# Patient Record
Sex: FEMALE | Race: WHITE | URBAN - METROPOLITAN AREA
[De-identification: names, ages, dates, MRNs, and addresses within clinical notes are randomized per-mention and may not be internally consistent; named-entity substitution may affect disease eponyms.]

---

## 2017-08-28 ENCOUNTER — APPOINTMENT (OUTPATIENT)
Dept: CT IMAGING | Facility: CLINIC | Age: 37
End: 2017-08-28
Attending: EMERGENCY MEDICINE
Payer: COMMERCIAL

## 2017-08-28 ENCOUNTER — HOSPITAL ENCOUNTER (EMERGENCY)
Facility: CLINIC | Age: 37
Discharge: HOME | End: 2017-08-28
Attending: EMERGENCY MEDICINE | Admitting: EMERGENCY MEDICINE
Payer: COMMERCIAL

## 2017-08-28 VITALS
WEIGHT: 230 LBS | HEART RATE: 65 BPM | OXYGEN SATURATION: 98 % | DIASTOLIC BLOOD PRESSURE: 69 MMHG | RESPIRATION RATE: 16 BRPM | TEMPERATURE: 98.3 F | SYSTOLIC BLOOD PRESSURE: 122 MMHG

## 2017-08-28 DIAGNOSIS — D64.9 ANEMIA, UNSPECIFIED TYPE: ICD-10-CM

## 2017-08-28 DIAGNOSIS — R42 DIZZINESS: ICD-10-CM

## 2017-08-28 LAB
ALBUMIN UR-MCNC: NEGATIVE MG/DL
ANION GAP SERPL CALCULATED.3IONS-SCNC: 7 MMOL/L (ref 3–14)
ANION GAP SERPL CALCULATED.3IONS-SCNC: NORMAL MMOL/L (ref 6–17)
APPEARANCE UR: CLEAR
BILIRUB UR QL STRIP: NEGATIVE
BUN SERPL-MCNC: 11 MG/DL (ref 7–30)
BUN SERPL-MCNC: NORMAL MG/DL (ref 7–30)
CALCIUM SERPL-MCNC: 8 MG/DL (ref 8.5–10.1)
CALCIUM SERPL-MCNC: NORMAL MG/DL (ref 8.5–10.1)
CHLORIDE SERPL-SCNC: 109 MMOL/L (ref 94–109)
CHLORIDE SERPL-SCNC: NORMAL MMOL/L (ref 94–109)
CO2 SERPL-SCNC: 23 MMOL/L (ref 20–32)
CO2 SERPL-SCNC: NORMAL MMOL/L (ref 20–32)
COLOR UR AUTO: ABNORMAL
CREAT SERPL-MCNC: 0.69 MG/DL (ref 0.52–1.04)
CREAT SERPL-MCNC: NORMAL MG/DL (ref 0.52–1.04)
ERYTHROCYTE [DISTWIDTH] IN BLOOD BY AUTOMATED COUNT: 13.8 % (ref 10–15)
ERYTHROCYTE [DISTWIDTH] IN BLOOD BY AUTOMATED COUNT: NORMAL % (ref 10–15)
GFR SERPL CREATININE-BSD FRML MDRD: >90 ML/MIN/1.7M2
GFR SERPL CREATININE-BSD FRML MDRD: NORMAL ML/MIN/1.7M2
GLUCOSE SERPL-MCNC: 94 MG/DL (ref 70–99)
GLUCOSE SERPL-MCNC: NORMAL MG/DL (ref 70–99)
GLUCOSE UR STRIP-MCNC: NEGATIVE MG/DL
HCG UR QL: NEGATIVE
HCT VFR BLD AUTO: 31.7 % (ref 35–47)
HCT VFR BLD AUTO: NORMAL % (ref 35–47)
HGB BLD-MCNC: 10.7 G/DL (ref 11.7–15.7)
HGB BLD-MCNC: NORMAL G/DL (ref 11.7–15.7)
HGB UR QL STRIP: ABNORMAL
KETONES UR STRIP-MCNC: NEGATIVE MG/DL
LEUKOCYTE ESTERASE UR QL STRIP: NEGATIVE
MCH RBC QN AUTO: 28.9 PG (ref 26.5–33)
MCH RBC QN AUTO: NORMAL PG (ref 26.5–33)
MCHC RBC AUTO-ENTMCNC: 33.8 G/DL (ref 31.5–36.5)
MCHC RBC AUTO-ENTMCNC: NORMAL G/DL (ref 31.5–36.5)
MCV RBC AUTO: 86 FL (ref 78–100)
MCV RBC AUTO: NORMAL FL (ref 78–100)
NITRATE UR QL: NEGATIVE
PH UR STRIP: 6.5 PH (ref 5–7)
PLATELET # BLD AUTO: 332 10E9/L (ref 150–450)
PLATELET # BLD AUTO: NORMAL 10E9/L (ref 150–450)
POTASSIUM SERPL-SCNC: 3.7 MMOL/L (ref 3.4–5.3)
POTASSIUM SERPL-SCNC: NORMAL MMOL/L (ref 3.4–5.3)
RBC # BLD AUTO: 3.7 10E12/L (ref 3.8–5.2)
RBC # BLD AUTO: NORMAL 10E12/L (ref 3.8–5.2)
RBC #/AREA URNS AUTO: <1 /HPF (ref 0–2)
SODIUM SERPL-SCNC: 139 MMOL/L (ref 133–144)
SODIUM SERPL-SCNC: NORMAL MMOL/L (ref 133–144)
SOURCE: ABNORMAL
SP GR UR STRIP: 1 (ref 1–1.03)
UROBILINOGEN UR STRIP-MCNC: NORMAL MG/DL (ref 0–2)
WBC # BLD AUTO: 9.3 10E9/L (ref 4–11)
WBC # BLD AUTO: NORMAL 10E9/L (ref 4–11)
WBC #/AREA URNS AUTO: <1 /HPF (ref 0–2)

## 2017-08-28 PROCEDURE — 96361 HYDRATE IV INFUSION ADD-ON: CPT

## 2017-08-28 PROCEDURE — 80048 BASIC METABOLIC PNL TOTAL CA: CPT | Performed by: EMERGENCY MEDICINE

## 2017-08-28 PROCEDURE — 85027 COMPLETE CBC AUTOMATED: CPT | Performed by: EMERGENCY MEDICINE

## 2017-08-28 PROCEDURE — 81025 URINE PREGNANCY TEST: CPT | Performed by: EMERGENCY MEDICINE

## 2017-08-28 PROCEDURE — 96360 HYDRATION IV INFUSION INIT: CPT

## 2017-08-28 PROCEDURE — 25000131 ZZH RX MED GY IP 250 OP 636 PS 637: Performed by: EMERGENCY MEDICINE

## 2017-08-28 PROCEDURE — 25000128 H RX IP 250 OP 636: Performed by: EMERGENCY MEDICINE

## 2017-08-28 PROCEDURE — 99285 EMERGENCY DEPT VISIT HI MDM: CPT | Mod: 25

## 2017-08-28 PROCEDURE — 81001 URINALYSIS AUTO W/SCOPE: CPT | Performed by: EMERGENCY MEDICINE

## 2017-08-28 PROCEDURE — 70450 CT HEAD/BRAIN W/O DYE: CPT

## 2017-08-28 PROCEDURE — 25000125 ZZHC RX 250: Performed by: EMERGENCY MEDICINE

## 2017-08-28 PROCEDURE — 70498 CT ANGIOGRAPHY NECK: CPT

## 2017-08-28 RX ORDER — IOPAMIDOL 755 MG/ML
70 INJECTION, SOLUTION INTRAVASCULAR ONCE
Status: COMPLETED | OUTPATIENT
Start: 2017-08-28 | End: 2017-08-28

## 2017-08-28 RX ORDER — MECLIZINE HCL 12.5 MG 12.5 MG/1
12.5 TABLET ORAL 4 TIMES DAILY PRN
Qty: 20 TABLET | Refills: 0 | Status: SHIPPED | OUTPATIENT
Start: 2017-08-28 | End: 2017-10-18

## 2017-08-28 RX ORDER — MECLIZINE HYDROCHLORIDE 25 MG/1
25 TABLET ORAL ONCE
Status: COMPLETED | OUTPATIENT
Start: 2017-08-28 | End: 2017-08-28

## 2017-08-28 RX ADMIN — MECLIZINE 25 MG: 25 TABLET ORAL at 00:49

## 2017-08-28 RX ADMIN — IOPAMIDOL 70 ML: 755 INJECTION, SOLUTION INTRAVENOUS at 01:23

## 2017-08-28 RX ADMIN — SODIUM CHLORIDE 1000 ML: 9 INJECTION, SOLUTION INTRAVENOUS at 00:48

## 2017-08-28 RX ADMIN — SODIUM CHLORIDE 80 ML: 9 INJECTION, SOLUTION INTRAVENOUS at 01:22

## 2017-08-28 ASSESSMENT — ENCOUNTER SYMPTOMS
DIZZINESS: 1
NUMBNESS: 0
WEAKNESS: 0
HEADACHES: 0
FREQUENCY: 1

## 2017-08-28 NOTE — ED AVS SNAPSHOT
Emergency Department    [address] [facility] [address]    Phone:  [phone]    Fax:  [phone]                                       Chance Mazariegos   MRN: [id number]    Department:   Emergency Department   Date of Visit:  8/28/2017           After Visit Summary Signature Page     I have received my discharge instructions, and my questions have been answered. I have discussed any challenges I see with this plan with the nurse or doctor.    ..........................................................................................................................................  Patient/Patient Representative Signature      ..........................................................................................................................................  Patient Representative Print Name and Relationship to Patient    ..................................................               ................................................  Date                                            Time    ..........................................................................................................................................  Reviewed by Signature/Title    ...................................................              ..............................................  Date                                                            Time

## 2017-08-28 NOTE — ED AVS SNAPSHOT
Emergency Department    [address] [facility] [address]    Phone:  [phone]    Fax:  [phone]                                       Chance Mazariegos   MRN: [id number]    Department:   Emergency Department   Date of Visit:  8/28/2017           Patient Information     Date Of Birth          1980        Your diagnoses for this visit were:     Dizziness     Anemia, unspecified type        You were seen by Arik Collado MD.      Follow-up Information     Follow up with [facility]. Schedule an appointment as soon as possible for a visit in 2 days.    Specialty:  Family Practice    Contact information:    [address] [facility] [address]  [facility] [id number] [phone]          Follow up with  Emergency Department.    Specialty:  EMERGENCY MEDICINE    Why:  If symptoms worsen    Contact information:    [address] [facility] [id number] [phone]      24 Hour Appointment Hotline       To make an appointment at any [facility], call [phone] ([phone]). If you don't have a family doctor or clinic, we will help you find one. [facility] clinics are conveniently located to serve the needs of you and your family.          ED Discharge Orders     PHYSICAL THERAPY REFERRAL       *This therapy referral will be filtered to a centralized scheduling office at [facility] and the patient will receive a call to schedule an appointment at a [facility] location most convenient for them. *     [facility] provides Physical Therapy evaluation and treatment and many specialty services across the [place] system.  If requesting a specialty program, please choose from the list below.    If you have not heard from the scheduling office within 2 business days, please call [phone] for all locations, with the exception of [place], please call [phone].  Treatment: Evaluation & Treatment  Special  "Instructions/Modalities:   Special Programs: Balance/Vestibular    Please be aware that coverage of these services is subject to the terms and limitations of your health insurance plan.  Call member services at your health plan with any benefit or coverage questions.      **Note to Provider:  If you are referring outside of [place] for the therapy appointment, please list the name of the location in the \"special instructions\" above, print the referral and give to the patient to schedule the appointment.                     Review of your medicines      START taking        Dose / Directions Last dose taken    meclizine 12.5 MG tablet   Commonly known as:  ANTIVERT   Dose:  12.5 mg   Quantity:  20 tablet        Take 1 tablet (12.5 mg) by mouth 4 times daily as needed for dizziness   Refills:  0          Our records show that you are taking the medicines listed below. If these are incorrect, please call your family doctor or clinic.        Dose / Directions Last dose taken    VITAMIN D (CHOLECALCIFEROL) PO        Take by mouth daily   Refills:  0                Prescriptions were sent or printed at these locations (1 Prescription)                   Other Prescriptions                Printed at Department/Unit printer (1 of 1)         meclizine (ANTIVERT) 12.5 MG tablet                Procedures and tests performed during your visit     Procedure/Test Number of Times Performed    Basic metabolic panel 2    CBC (+ platelets, no diff) 1    CBC with platelets 1    HCG qualitative urine 1    Head CT w/o contrast 1    Head/neck angiogram CT  w & w/o contrast 1    UA with Microscopic 1      Orders Needing Specimen Collection     None      Pending Results     Date and Time Order Name Status Description    8/28/2017 0029 Head/neck angiogram CT  w & w/o contrast In process     8/28/2017 0029 Head CT w/o contrast In process             Pending Culture Results     No orders found from 8/26/2017 to 8/29/2017.            Pending " Results Instructions     If you had any lab results that were not finalized at the time of your Discharge, you can call the ED Lab Result RN at [phone]. You will be contacted by this team for any positive Lab results or changes in treatment. The nurses are available 7 days a week from 10A to 6:30P.  You can leave a message 24 hours per day and they will return your call.        Test Results From Your Hospital Stay        8/28/2017 12:36 AM      Component Results     Component Value Ref [place] & Units Status    Color Urine Straw  Final    Appearance Urine Clear  Final    Glucose Urine Negative NEG^Negative mg/dL Final    Bilirubin Urine Negative NEG^Negative Final    Ketones Urine Negative NEG^Negative mg/dL Final    Specific Gravity Urine 1.000 (L) 1.003 - 1.035 Final    Blood Urine Trace (A) NEG^Negative Final    pH Urine 6.5 5.0 - 7.0 pH Final    Protein Albumin Urine Negative NEG^Negative mg/dL Final    Urobilinogen mg/dL Normal 0.0 - 2.0 mg/dL Final    Nitrite Urine Negative NEG^Negative Final    Leukocyte Esterase Urine Negative NEG^Negative Final    Source Midstream Urine  Final    WBC Urine <1 0 - 2 /HPF Final    RBC Urine <1 0 - 2 /HPF Final         8/28/2017 12:36 AM      Component Results     Component Value Ref [place] & Units Status    HCG Qual Urine Negative NEG^Negative Final    This test is for screening purposes.  Results should be interpreted along with   the clinical picture.  Confirmation testing is available if warranted by   ordering [id number], HCG Quantitative Pregnancy.           8/28/2017  1:35 AM      Component Results     Component Value Ref Range & Units Status    WBC Canceled, Test credited 4.0 - 11.0 10e9/L Final    Unsatisfactory specimen - clotted  NOTIFIED ER VIA TRACKBOARD AT 0134      RBC Count Canceled, Test credited 3.8 - 5.2 10e12/L Final    Unsatisfactory specimen - clotted  NOTIFIED ER VIA TRACKBOARD AT 0134      Hemoglobin Canceled, Test credited 11.7 - 15.7 g/dL Final     Unsatisfactory specimen - clotted  NOTIFIED ER VIA TRACKBOARD AT 0134      Hematocrit Canceled, Test credited 35.0 - 47.0 % Final    Unsatisfactory specimen - clotted  NOTIFIED ER VIA TRACKBOARD AT 0134      MCV Canceled, Test credited 78 - 100 fl Final    Unsatisfactory specimen - clotted  NOTIFIED ER VIA TRACKBOARD AT 0134      MCH Canceled, Test credited 26.5 - 33.0 pg Final    Unsatisfactory specimen - clotted  NOTIFIED ER VIA TRACKBOARD AT 0134      MCHC Canceled, Test credited 31.5 - 36.5 g/dL Final    Unsatisfactory specimen - clotted  NOTIFIED ER VIA TRACKBOARD AT 0134      RDW Canceled, Test credited 10.0 - 15.0 % Final    Unsatisfactory specimen - clotted  NOTIFIED ER VIA TRACKBOARD AT 0134      Platelet Count Canceled, Test credited 150 - 450 10e9/L Final    Unsatisfactory specimen - clotted  NOTIFIED ER VIA TRACKBOARD AT 0134           8/28/2017  1:20 AM      Component Results     Component Value Ref [place] & Units Status    Sodium Canceled, Test credited 133 - 144 mmol/L Final    Specimen hemolyzed  NOTIFIED ER VIA TRACKBOARD AT 0119      Potassium Canceled, Test credited 3.4 - 5.3 mmol/L Final    Specimen hemolyzed  NOTIFIED ER VIA TRACKBOARD AT 0119      Chloride Canceled, Test credited 94 - 109 mmol/L Final    Specimen hemolyzed  NOTIFIED ER VIA TRACKBOARD AT 0119      Carbon Dioxide Canceled, Test credited 20 - 32 mmol/L Final    Specimen hemolyzed  NOTIFIED ER VIA TRACKBOARD AT 0119      Anion Gap Canceled, Test credited 6 - 17 mmol/L Final    Specimen hemolyzed  NOTIFIED ER VIA TRACKBOARD AT 0119      Glucose Canceled, Test credited 70 - 99 mg/dL Final    Specimen hemolyzed  NOTIFIED ER VIA TRACKBOARD AT 0119      Urea Nitrogen Canceled, Test credited 7 - 30 mg/dL Final    Specimen hemolyzed  NOTIFIED ER VIA TRACKBOARD AT 0119      Creatinine Canceled, Test credited 0.52 - 1.04 mg/dL Final    Specimen hemolyzed  NOTIFIED ER VIA TRACKBOARD AT 0119      GFR Estimate Canceled, Test credited >60  mL/min/1.7m2 Final    Specimen hemolyzed  NOTIFIED ER VIA TRACKBOARD AT 0119      GFR Estimate If Black Canceled, Test credited >60 mL/min/1.7m2 Final    Specimen hemolyzed  NOTIFIED ER VIA TRACKBOARD AT 0119      Calcium Canceled, Test credited 8.5 - 10.1 mg/dL Final    Specimen hemolyzed  NOTIFIED ER VIA TRACKBOARD AT 0119           8/28/2017  1:21 AM      Result not yet available     Exam Ended         8/28/2017  1:18 AM      Result not yet available     Exam Ended         8/28/2017  1:58 AM      Component Results     Component Value Ref Range & Units Status    Sodium 139 133 - 144 mmol/L Final    Potassium 3.7 3.4 - 5.3 mmol/L Final    Chloride 109 94 - 109 mmol/L Final    Carbon Dioxide 23 20 - 32 mmol/L Final    Anion Gap 7 3 - 14 mmol/L Final    Glucose 94 70 - 99 mg/dL Final    Urea Nitrogen 11 7 - 30 mg/dL Final    Creatinine 0.69 0.52 - 1.04 mg/dL Final    GFR Estimate >90 >60 mL/min/1.7m2 Final    Non  GFR Calc    GFR Estimate If Black >90 >60 mL/min/1.7m2 Final    African American GFR Calc    Calcium 8.0 (L) 8.5 - 10.1 mg/dL Final         8/28/2017  1:44 AM      Component Results     Component Value Ref Range & Units Status    WBC 9.3 4.0 - 11.0 10e9/L Final    RBC Count 3.70 (L) 3.8 - 5.2 10e12/L Final    Hemoglobin 10.7 (L) 11.7 - 15.7 g/dL Final    Hematocrit 31.7 (L) 35.0 - 47.0 % Final    MCV 86 78 - 100 fl Final    MCH 28.9 26.5 - 33.0 pg Final    MCHC 33.8 31.5 - 36.5 g/dL Final    RDW 13.8 10.0 - 15.0 % Final    Platelet Count 332 150 - 450 10e9/L Final                Clinical Quality Measure: Blood Pressure Screening     Your blood pressure was checked while you were in the emergency department today. The last reading we obtained was  BP: 122/69 . Please read the guidelines below about what these numbers mean and what you should do about them.  If your systolic blood pressure (the top number) is less than 120 and your diastolic blood pressure (the bottom number) is less than 80,  "then your blood pressure is normal. There is nothing more that you need to do about it.  If your systolic blood pressure (the top number) is 120-139 or your diastolic blood pressure (the bottom number) is 80-89, your blood pressure may be higher than it should be. You should have your blood pressure rechecked within a year by a primary care provider.  If your systolic blood pressure (the top number) is 140 or greater or your diastolic blood pressure (the bottom number) is 90 or greater, you may have high blood pressure. High blood pressure is treatable, but if left untreated over time it can put you at risk for heart attack, stroke, or kidney failure. You should have your blood pressure rechecked by a primary care provider within the next 4 weeks.  If your provider in the emergency department today gave you specific instructions to follow-up with your doctor or provider even sooner than that, you should follow that instruction and not wait for up to 4 weeks for your follow-up visit.        Thank you for choosing [facility]       Thank you for choosing [facility] for your care. Our goal is always to provide you with excellent care. Hearing back from our patients is one way we can continue to improve our services. Please take a few minutes to complete the written survey that you may receive in the mail after you visit with us. Thank you!        [employer] Information     [employer] lets you send messages to your doctor, view your test results, renew your prescriptions, schedule appointments and more. To sign up, go to [email or web address] . Click on \"Log in\" on the left side of the screen, which will take you to the Welcome page. Then click on \"Sign up Now\" on the right side of the page.     You will be asked to enter the access code listed below, as well as some personal information. Please follow the directions to create your username and password.     Your access code is: [email or web address]  Expires: 11/26/2017  2:26 AM   "   Your access code will  in 90 days. If you need help or a new code, please call your [facility] clinic or [phone].        Care EveryWhere ID     This is your Care EveryWhere ID. This could be used by other organizations to access your [facility] medical records  [id number]        Equal Access to Services     LUCY GLASER : Josias toneyo Soelizabeth, waaxda luqadaha, qaybta kaalmaraysa nguyen, hortencia sepulveda. So [facility] [phone].    ATENCIÓN: Si habla español, tiene a dickerson disposición servicios gratuitos de asistencia lingüística. Llame al [phone].    We comply with applicable federal civil rights laws and [place] laws. We do not discriminate on the basis of race, color, national origin, age, disability sex, sexual orientation or gender identity.            After Visit Summary       This is your record. Keep this with you and show to your community pharmacist(s) and doctor(s) at your next visit.

## 2017-08-28 NOTE — ED PROVIDER NOTES
History     Chief Complaint:  Dizziness      HPI   Chance Mazariegos is a 36 year old female who presents to the emergency department for evaluation of dizziness. The patient states that she had driven home from [place] earlier today and arrived home at approximately 2200. She notes that she went to lay down on the couch as she was feeling generally fatigued and was having some slight left ear pain. When she sat up suddenly to take a dose of ibuprofen for her ear discomfort, she suddenly felt generally quite dizzy and unsteady. This sensation of unsteadiness is exacerbated by positional changes and resolve when she is sitting still after approximately 20-30 seconds. She denies any recent headache, visual changes, tinnitus, or new numbness or weakness. She does, however, note that she has been urinating frequently this evening. Of note, the patient notes that she had recent chiropractor adjustment on her neck approximately two weeks ago.      Allergies:  Penicillins    Medications:    The patient is currently on no regular medications.      Past Medical History:    Hashimoto's thyroiditis    Past Surgical History:    GI surgery due to intestinal malrotation  Family / Social History:    No past pertinent family history.     Social History:  Presents with her significant other.   Negative for tobacco use.  Marital Status:   [2]    Review of Systems   HENT: Positive for ear pain. Negative for tinnitus.    Eyes: Negative for visual disturbance.   Genitourinary: Positive for frequency.   Neurological: Positive for dizziness. Negative for weakness, numbness and headaches.   All other systems reviewed and are negative.    Physical Exam   First Vitals:  BP: 143/74  Pulse: 82  Temp: 98.3  F (36.8  C)  Resp: 18  Weight: 104.3 kg (230 lb)  SpO2: 100 %      Physical Exam  General:                        Well-nourished                        Speaking in full sentences  Eyes:                        Conjunctiva without  injection or scleral icterus                        PERRL                        No nystagmus noted  ENT:                        Moist mucous membranes                        Posterior oropharynx clear without erythema or exudate                        Nares patent                        Pinnae normal  Neck:                        Full ROM                        No stiffness appreciated  Resp:                        Lungs CTAB                        No crackles, wheezing or audible rubs                        Good air movement  CV:                                        Normal rate, regular rhythm                        S1 and S2 present                        No murmur, gallop or rub  GI:                        BS present                        Abdomen soft without distention                        Non-tender to light and deep palpation                        No guarding or rebound tenderness  Skin:                        Warm, dry, well perfused                        No rashes or open wounds on exposed skin  MSK:                        Moves all extremities                        No focal deformities or swelling  Neuro:                        Alert                        CN III-XII intact                        5/5  strength                        5/5 ankle dorsi/plantarflexion                        SILT in BUE and BLE                        Normal finger-to-nose                        Answers questions appropriately                        Moves all extremities equally                        Gait stable  Psych:                        Anxious    Emergency Department Course   Imaging:  Radiographic findings were communicated with the patient who voiced understanding of the findings.    CT Head without contrast:   1. Normal head CT.   2. No CT findings of mass, infarct, or hemorrhage As per radiology.    CT Head Neck Angio with and without contrast:   Normal intracranial circulation. No hemodynamically  significant narrowing throughout major neck vessels.  As per radiology.    Laboratory:  CBC: WBC: 9.3, HGB: 10.7 (L), PLT: 332  BMP: Calcium 8.0 (L), o/w WNL (Creatinine: 0.69)    UA with micro: specific gravity 1.000 (L), trace blood o/w negative  HCG quantitative urine: Negative    Interventions:  0048 NS 1L IV  0049 Meclizine 25 mg PO    Emergency Department Course:  Nursing notes and vitals reviewed. 0013 I performed an exam of the patient as documented above.     IV inserted. Medicine administered as documented above. Blood drawn. This was sent to the lab for further testing, results above.    The patient was sent for a Head CT and CTA Head and Neck while in the emergency department, findings above.     The patient provided a urine sample here in the emergency department. This was sent for laboratory testing, findings above.     0154 I rechecked the patient and discussed the results of her workup thus far.     The patient was ambulated here in the emergency department without difficulty.     0219 I reevaluated the patient and provided an update in regards to her ED course.  She notes that she is feeling improved at this time.     Findings and plan explained to the Patient. Patient discharged home with instructions regarding supportive care, medications, and reasons to return. The importance of close follow-up was reviewed. The patient was prescribed meclizine.     I personally reviewed the laboratory results with the Patient and answered all related questions prior to discharge.     HiNTs Exam for Vertigo of Central Cause (reference)  Background  Three step bedside oculomotor exam for patients presenting with an acute vestibular syndrome.  It is 100% sensitive and 96% specific for posterior circulation cause.  The test is abnormal (suggesting central cause) if any of the three criteria are present:  Saccade corrections are ABSENT on rapid head turning (head impulse test)  Nystagmus is present that is vertical,  torsional or direction changing  Vertical gaze corrections on alternate eye cover testing (Test of Skew)  Criteria   Abnormal if any of the following criteria are present (of 3 possible):  NEGATIVE  Interpretation  Findings consistent with peripheral vertigo as no HiNTs Criteria are present            Impression & Plan    Medical Decision Making:  Chance Mazariegos is a 36 year old female who presents to the ER accompanied by her significant other for evaluation of dizziness. Vital signs on presentation reveal elevated blood pressure, which improved ED course, though are otherwise unremarkable. Differential diagnosis includes peripheral etiologies (BPPV, vestibular neuritis, labyrinthitis, Meniere's Disease), CNS causes (tumor, CVA, mass/lesion, dissection), dysrhythmia, anemia, among others. Symptoms at present are most consistent with peripheral vertigo. She notes the distinct onset of symptoms with changing from supine to sitting, with symptoms lasting in the order of 20-30 seconds before spontaneous resolution. No evidence of cerumen impaction or auditory canal lesions noted. CNS causes were strongly considered, though felt unlikely. Her neurologic exam is nonfocal, including cerebellar testing. HINTS exam suggests peripheral etiologies. Given recent cervical spine manipulation with chiropractor medicine, I considered vascular lesion. CT/CTA are unremarkable and without evidence of vascular occulusion or dissection. The patient was provided the above symptomatic cares, with noted improvement in symptoms. Again, given my low pretest probability for CNS, I feel that MRI can be deferred safely. Labs reveal hemoglobin of 10.7, which patient was informed of. She has no evidence of hemodynamic instability or external blood loss to suggest acute blood loss. Metabolic function grossly unremarkable. She was ambulatory with a steady gait. Clinical impression discussed with patient and significant other. I feel that she is  stable for discharge home, with close outpatient follow up. Outpatient order was placed to establish care with vestibular physical therapy for assistance with symptom control. I have also referred patient to follow up with [facility] as an outpatient as she is yet to establish primary care since moving to the [facility]. She is welcome to return to the ER at any point with new or troubling symptoms, vomiting, or any other concerns. She will be discharged home with a course of meclizine. All questions answered prior to discharge.      Diagnosis:    ICD-10-CM   1. Dizziness R42   2. Anemia, unspecified type D64.9       Disposition:  discharged to home    Discharge Medications:  New Prescriptions    MECLIZINE (ANTIVERT) 12.5 MG TABLET    Take 1 tablet (12.5 mg) by mouth 4 times daily as needed for dizziness     IEla, am serving as a scribe on 8/28/2017 at 12:13 AM to personally document services performed by Arik Collado MD based on my observations and the provider's statements to me.     Ela Win  8/28/2017    EMERGENCY DEPARTMENT       Arik Collado MD  08/28/17 0543

## 2017-10-05 ENCOUNTER — TELEPHONE (OUTPATIENT)
Dept: ENDOCRINOLOGY | Facility: CLINIC | Age: 37
End: 2017-10-05

## 2017-10-05 ENCOUNTER — OFFICE VISIT (OUTPATIENT)
Dept: FAMILY MEDICINE | Facility: CLINIC | Age: 37
End: 2017-10-05
Payer: COMMERCIAL

## 2017-10-05 VITALS
BODY MASS INDEX: 35.79 KG/M2 | SYSTOLIC BLOOD PRESSURE: 110 MMHG | DIASTOLIC BLOOD PRESSURE: 74 MMHG | WEIGHT: 228 LBS | HEIGHT: 67 IN | OXYGEN SATURATION: 99 % | HEART RATE: 78 BPM | TEMPERATURE: 98.8 F

## 2017-10-05 DIAGNOSIS — E06.3 HASHIMOTO'S THYROIDITIS: Primary | ICD-10-CM

## 2017-10-05 DIAGNOSIS — M25.552 HIP PAIN, LEFT: ICD-10-CM

## 2017-10-05 DIAGNOSIS — M54.2 NECK PAIN: ICD-10-CM

## 2017-10-05 DIAGNOSIS — Z76.89 ENCOUNTER TO ESTABLISH CARE: ICD-10-CM

## 2017-10-05 DIAGNOSIS — E55.9 VITAMIN D DEFICIENCY: ICD-10-CM

## 2017-10-05 DIAGNOSIS — Q43.3 INTESTINAL MALROTATION (H): ICD-10-CM

## 2017-10-05 PROCEDURE — 99204 OFFICE O/P NEW MOD 45 MIN: CPT | Performed by: INTERNAL MEDICINE

## 2017-10-05 RX ORDER — ERGOCALCIFEROL 1.25 MG/1
50000 CAPSULE, LIQUID FILLED ORAL
Qty: 8 CAPSULE | Refills: 3 | Status: SHIPPED | OUTPATIENT
Start: 2017-10-05 | End: 2017-11-24

## 2017-10-05 NOTE — TELEPHONE ENCOUNTER
To schedulers: Please schedule her for lst available endocrine    Erendira Chacko MD  Endocrine triage

## 2017-10-05 NOTE — PROGRESS NOTES
Chief Complaint:     Establish care    HPI:   Patient Chance Mazariegos is a very pleasant 36 year old female with history of Hashimoto's thyroiditis, history of previous intestinal malrotation who presents to Internal Medicine clinic today to establish care and for follow up of multiple health concerns. The patient recently relocated from [place] to [place]. Regarding the patient's history of intestinal malrotation, the patient previously underwent surgery treatment a few years ago. The patient is still complaining of chronic intermittent mild abdominal discomfort and malabsorption symptoms since the previous surgery, the patient is interested in a General surgery clinic referral for a second opinion. Regarding the patient's hashimoto's thyroiditis, the patient is interested in an evaluation by Endocrinology specialist clinic going forward. Regarding the patient's chronic vitamin D deficiency, the patient is requesting a refill of her Vitamin D medication. Regarding the patient's chronic mechanical neck pain, the patient is requesting a referral for a spine specialist clinic evaluation. Regarding the patient's chronic left hip pain, the patient is interested in an ortho clinic evaluation going forward. The patient denies any chest pain, headaches, fever or chills. The patient declined influenza vaccine at this time. Patient is also requesting referral to establish care with outpatient OB/GYN.          Current Medications:     Current Outpatient Prescriptions   Medication Sig Dispense Refill     vitamin D (ERGOCALCIFEROL) 57031 UNIT capsule Take 1 capsule (50,000 Units) by mouth every 7 days for 8 doses 8 capsule 3     meclizine (ANTIVERT) 12.5 MG tablet Take 1 tablet (12.5 mg) by mouth 4 times daily as needed for dizziness 20 tablet 0     VITAMIN D, CHOLECALCIFEROL, PO Take by mouth daily           Allergies:      Allergies   Allergen Reactions     Penicillins             Past Medical History:     Past Medical  "History:   Diagnosis Date     Hashimoto's thyroiditis          Past Surgical History:     Past Surgical History:   Procedure Laterality Date     intestinal malrotation surgery 2011           Family Medical History:     Family History   Problem Relation Age of Onset     Neuropathy Mother      CANCER Father          Social History:     Social History     Social History     Marital status:      Spouse name: N/A     Number of children: N/A     Years of education: N/A     Occupational History     Not on file.     Social History Main Topics     Smoking status: Never Smoker     Smokeless tobacco: Never Used     Alcohol use No     Drug use: No     Sexual activity: Yes     Partners: Female     Other Topics Concern     Not on file     Social History Narrative           Review of System:     Constitutional: Negative for fever or chills  Skin: Negative for rashes  Ears/Nose/Throat: Negative for nasal congestion, sore throat  Respiratory: No shortness of breath, dyspnea on exertion, cough, or hemoptysis  Cardiovascular: Negative for chest pain  Gastrointestinal: Negative for nausea, vomiting  Genitourinary: Negative for dysuria, hematuria  Musculoskeletal: Negative for myalgias. Positive for chronic mechanical neck and left hip pains  Neurologic: Negative for headaches  Psychiatric: Negative for depression, anxiety  Hematologic/Lymphatic/Immunologic: Negative  Endocrine: Negative  Behavioral: Negative for tobacco use       Physical Exam:   /74 (BP Location: Right arm, Cuff Size: Adult Large)  Pulse 78  Temp 98.8  F (37.1  C) (Oral)  Ht 5' 7\" (1.702 m)  Wt 228 lb (103.4 kg)  LMP  (LMP Unknown)  SpO2 99%  Breastfeeding? No  BMI 35.71 kg/m2    GENERAL: healthy, alert and no distress  EYES: eyes grossly normal to inspection, and conjunctivae and sclerae normal  HENT: Normocephalic atraumatic. Nose and mouth without ulcers or lesions  NECK: supple  RESP: lungs clear to auscultation   CV: regular rate and rhythm, " normal S1 S2  LYMPH: no peripheral edema   ABDOMEN: non-distended, soft, non-tender, no rebound or guarding. Positive bowel sounds throughout. Large midline surgical scar present from previous intestinal malrotation surgery several years ago.  MS: pain with range of motion movements noted of the left hip and neck.  SKIN: no suspicious lesions or rashes  NEURO: Alert & Oriented x 3.   PSYCH: mentation appears normal, affect normal        Diagnostic Test Results:     Diagnostic Test Results:  Results for orders placed or performed during the hospital encounter of 08/28/17   Head CT w/o contrast    Narrative    CT OF THE HEAD WITHOUT CONTRAST August 28, 2017 1:45 AM     HISTORY: Dizziness.    TECHNIQUE: Axial CT images of the head from the skull base to the  vertex were acquired without IV contrast. Radiation dose for this scan  was reduced using automated exposure control, adjustment of the mA  and/or kV according to patient size, or iterative reconstruction  technique.    COMPARISON: None.    FINDINGS: The ventricles and basal cisterns are within normal limits  in configuration. There is no midline shift. There are no extra-axial  fluid collections.  Gray-white differentiation is well maintained.     No intracranial hemorrhage, mass or recent infarct.    The visualized paranasal sinuses are well-aerated. There is no  mastoiditis. There are no fractures of the visualized bones.       Impression    IMPRESSION: Normal head CT.         KEVIN TIPTON MD   Head/neck angiogram CT  w & w/o contrast    Narrative    CT ANGIOGRAM OF THE HEAD AND NECK WITHOUT AND WITH CONTRAST  8/28/2017  2:09 AM     COMPARISON: None.    HISTORY: Dizziness, left-sided neck pain, chiropractor manipulation.  Question dissection.    TECHNIQUE:  Precontrast localizing scans were followed by CT  angiography with an injection of 70 mL Isovue-370 nonionic intravenous  contrast material with scans through the head and neck.  Images were  transferred to  a separate 3-D workstation where multiplanar  reformations and 3-D images were created.  Estimates of carotid  stenoses are made relative to the distal internal carotid artery  diameters except as noted.      FINDINGS:   Neck CTA: The bilateral common carotid, bilateral cervical internal  carotid and bilateral vertebral arteries are patent without stenosis.     Head CTA: The basilar, bilateral distal internal carotid, bilateral  anterior cerebral, bilateral middle cerebral and bilateral posterior  cerebral arteries are patent and unremarkable. The anterior  communicating and bilateral posterior communicating arteries are  patent and unremarkable.      Impression    IMPRESSION: Normal neck and head CTA.     I concur with the preliminary report provided to the ordering provider  by the overnight Neuroradiologist from [facility].    Radiation dose for this scan was reduced using automated exposure  control, adjustment of the mA and/or kV according to patient size, or  iterative reconstruction technique.    KEVIN TIPTON MD   UA with Microscopic   Result Value Ref Range    Color Urine Straw     Appearance Urine Clear     Glucose Urine Negative NEG^Negative mg/dL    Bilirubin Urine Negative NEG^Negative    Ketones Urine Negative NEG^Negative mg/dL    Specific Gravity Urine 1.000 (L) 1.003 - 1.035    Blood Urine Trace (A) NEG^Negative    pH Urine 6.5 5.0 - 7.0 pH    Protein Albumin Urine Negative NEG^Negative mg/dL    Urobilinogen mg/dL Normal 0.0 - 2.0 mg/dL    Nitrite Urine Negative NEG^Negative    Leukocyte Esterase Urine Negative NEG^Negative    Source Midstream Urine     WBC Urine <1 0 - 2 /HPF    RBC Urine <1 0 - 2 /HPF   HCG qualitative urine   Result Value Ref Range    HCG Qual Urine Negative NEG^Negative   CBC (+ platelets, no diff)   Result Value Ref Range    WBC Canceled, Test credited 4.0 - 11.0 10e9/L    RBC Count Canceled, Test credited 3.8 - 5.2 10e12/L    Hemoglobin Canceled, Test credited 11.7 -  15.7 g/dL    Hematocrit Canceled, Test credited 35.0 - 47.0 %    MCV Canceled, Test credited 78 - 100 fl    MCH Canceled, Test credited 26.5 - 33.0 pg    MCHC Canceled, Test credited 31.5 - 36.5 g/dL    RDW Canceled, Test credited 10.0 - 15.0 %    Platelet Count Canceled, Test credited 150 - 450 10e9/L   Basic metabolic panel   Result Value Ref Range    Sodium Canceled, Test credited 133 - 144 mmol/L    Potassium Canceled, Test credited 3.4 - 5.3 mmol/L    Chloride Canceled, Test credited 94 - 109 mmol/L    Carbon Dioxide Canceled, Test credited 20 - 32 mmol/L    Anion Gap Canceled, Test credited 6 - 17 mmol/L    Glucose Canceled, Test credited 70 - 99 mg/dL    Urea Nitrogen Canceled, Test credited 7 - 30 mg/dL    Creatinine Canceled, Test credited 0.52 - 1.04 mg/dL    GFR Estimate Canceled, Test credited >60 mL/min/1.7m2    GFR Estimate If Black Canceled, Test credited >60 mL/min/1.7m2    Calcium Canceled, Test credited 8.5 - 10.1 mg/dL   Basic metabolic panel   Result Value Ref Range    Sodium 139 133 - 144 mmol/L    Potassium 3.7 3.4 - 5.3 mmol/L    Chloride 109 94 - 109 mmol/L    Carbon Dioxide 23 20 - 32 mmol/L    Anion Gap 7 3 - 14 mmol/L    Glucose 94 70 - 99 mg/dL    Urea Nitrogen 11 7 - 30 mg/dL    Creatinine 0.69 0.52 - 1.04 mg/dL    GFR Estimate >90 >60 mL/min/1.7m2    GFR Estimate If Black >90 >60 mL/min/1.7m2    Calcium 8.0 (L) 8.5 - 10.1 mg/dL   CBC with platelets   Result Value Ref Range    WBC 9.3 4.0 - 11.0 10e9/L    RBC Count 3.70 (L) 3.8 - 5.2 10e12/L    Hemoglobin 10.7 (L) 11.7 - 15.7 g/dL    Hematocrit 31.7 (L) 35.0 - 47.0 %    MCV 86 78 - 100 fl    MCH 28.9 26.5 - 33.0 pg    MCHC 33.8 31.5 - 36.5 g/dL    RDW 13.8 10.0 - 15.0 %    Platelet Count 332 150 - 450 10e9/L       ASSESSMENT/PLAN:       (E06.3) Hashimoto's thyroiditis  (primary encounter diagnosis)  Comment: Chronic hashimoto's thyroiditis, patient is requesting a referral to establish follow up with outpatient Endocrinology going  forward.  Plan: I have ordered ENDOCRINOLOGY ADULT REFERRAL today.      (Q43.3) Intestinal malrotation  Comment: The patient is still complaining of chronic intermittent mild abdominal discomfort and malabsorption symptoms since the previous surgery, the patient is interested in a General surgery clinic referral for a second opinion.   Plan: I have ordered GENERAL SURG ADULT REFERRAL today.      (E55.9) Vitamin D deficiency  Comment: Patient is requesting a refill of her Vitamin D medication.   Plan: I have refilled the patient's vitamin D (ERGOCALCIFEROL) 00099 UNIT capsule medication today.      (M54.2) Neck pain  Comment: chronic mechanical neck pain, the patient is requesting a referral for a spine specialist clinic evaluation.   Plan: I have ordered referral for spine specialist clinic for further evaluation.      (Z76.89) Encounter to establish care  Comment: Patient is requesting referral to establish care with outpatient OB/GYN.  Plan: I have ordered OB/GYN REFERRAL today.      (M25.552) Hip pain, left  Comment: chronic left hip pain, the patient is interested in an ortho clinic evaluation going forward.  Plan: I have ordered ORTHO  REFERRAL today.        Follow Up Plan:     Patient is instructed to return to Internal Medicine clinic for follow-up visit in 1 year or sooner as needed.        Edyta Campbell MD  Internal Medicine  [facility]

## 2017-10-05 NOTE — TELEPHONE ENCOUNTER
----- Message from Courtney Simms sent at 10/5/2017  3:24 PM CDT -----  Regarding: call back/ new pt appt  Contact: [phone]  Pt requesting call back to schedule a new pt appt. Referral is in Epic. Pt can be reached at [phone]. Thanks.    [facility]  Call Center    Please DO NOT send this message and/or reply back to sender.  Call Center Representatives DO NOT respond to messages.

## 2017-10-05 NOTE — NURSING NOTE
"Chief Complaint   Patient presents with     Establish Care     Thyroid Problem       Initial /74 (BP Location: Right arm, Cuff Size: Adult Large)  Pulse 78  Temp 98.8  F (37.1  C) (Oral)  Ht 5' 7\" (1.702 m)  Wt 228 lb (103.4 kg)  LMP  (LMP Unknown)  SpO2 99%  Breastfeeding? No  BMI 35.71 kg/m2 Estimated body mass index is 35.71 kg/(m^2) as calculated from the following:    Height as of this encounter: 5' 7\" (1.702 m).    Weight as of this encounter: 228 lb (103.4 kg).  Medication Reconciliation: complete     BINU [place]      "

## 2017-10-05 NOTE — MR AVS SNAPSHOT
After Visit Summary   10/5/2017    Chance Mazariegos    MRN: [id number]           Patient Information     Date Of Birth          1980        Visit Information        Provider Department      10/5/2017 10:00 AM Edyta Campbell MD [facility]        Today's Diagnoses     Hashimoto's thyroiditis    -  1    Intestinal malrotation        Vitamin D deficiency        Neck pain        Encounter to establish care           Follow-ups after your visit        Additional Services     ENDOCRINOLOGY ADULT REFERRAL       Your provider has referred you to: FMG: [facility] [phone]   [email or web address]  UMP: [facility] [phone]   [email or web address]  N: Endocrinology Clinic [facility] [phone]   [email or web address]      Please be aware that coverage of these services is subject to the terms and limitations of your health insurance plan.  Call member services at your health plan with any benefit or coverage questions.      Please bring the following to your appointment:    >>   Any x-rays, CTs or MRIs which have been performed.  Contact the facility where they were done to arrange for  prior to your scheduled appointment.    >>   List of current medications   >>   This referral request   >>   Any documents/labs given to you for this referral            GENERAL SURG ADULT REFERRAL       Your provider has referred you to: FMG: [facility] [place] [facility] [phone]   [email or web address]    Please be aware that coverage of these services is subject to the terms and limitations of your health insurance plan.  Call member services at your health plan with any benefit or coverage questions.      Please bring the following with you to your appointment:    (1) Any X-Rays, CTs or  MRIs which have been performed.  Contact the facility where they were done to arrange for  prior to your scheduled appointment.   (2) List of current medications   (3) This referral request   (4) Any documents/labs given to you for this referral            OB/GYN REFERRAL       Your provider has referred you to:  REY: [facility] [phone]  [email or web address]    Please be aware that coverage of these services is subject to the terms and limitations of your health insurance plan.  Call member services at your health plan with any benefit or coverage questions.      Please bring the following with you to your appointment:    (1) Any X-Rays, CTs or MRIs which have been performed.  Contact the facility where they were done to arrange for  prior to your scheduled appointment.   (2) List of current medications   (3) This referral request   (4) Any documents/labs given to you for this referral                  Who to contact     If you have questions or need follow up information about today's clinic visit or your schedule please contact [facility] directly at [phone].  Normal or non-critical lab and imaging results will be communicated to you by MyChart, letter or phone within 4 business days after the clinic has received the results. If you do not hear from us within 7 days, please contact the clinic through [facility] or phone. If you have a critical or abnormal lab result, we will notify you by phone as soon as possible.  Submit refill requests through [facility] or call your pharmacy and they will forward the refill request to us. Please allow 3 business days for your refill to be completed.          Additional Information About Your Visit        [facility] Information     [facility] lets you send messages to your doctor, view your test results, renew your prescriptions, schedule appointments and more. To sign up, go to  "[email or web address] . Click on \"Log in\" on the left side of the screen, which will take you to the Welcome page. Then click on \"Sign up Now\" on the right side of the page.     You will be asked to enter the access code listed below, as well as some personal information. Please follow the directions to create your username and password.     Your access code is: [email or web address]  Expires: 2017  2:26 AM     Your access code will  in 90 days. If you need help or a new code, please call your [place] clinic or [phone].        Care EveryWhere ID     This is your Care EveryWhere ID. This could be used by other organizations to access your [place] medical records  [id number]        Your Vitals Were     Pulse Temperature Height Last Period Pulse Oximetry Breastfeeding?    78 98.8  F (37.1  C) (Oral) 5' 7\" (1.702 m) (LMP Unknown) 99% No    BMI (Body Mass Index)                   35.71 kg/m2            Blood Pressure from Last 3 Encounters:   10/05/17 110/74   17 122/69    Weight from Last 3 Encounters:   10/05/17 228 lb (103.4 kg)   17 230 lb (104.3 kg)              We Performed the Following     ENDOCRINOLOGY ADULT REFERRAL     GENERAL SURG ADULT REFERRAL     OB/GYN REFERRAL          Today's Medication Changes          These changes are accurate as of: 10/5/17 10:19 AM.  If you have any questions, ask your nurse or doctor.               Start taking these medicines.        Dose/Directions    vitamin D 89136 UNIT capsule   Commonly known as:  ERGOCALCIFEROL   Used for:  Vitamin D deficiency   Started by:  Edyta Campbell MD        Dose:  06870 Units   Take 1 capsule (50,000 Units) by mouth every 7 days for 8 doses   Quantity:  8 capsule   Refills:  3            Where to get your medicines      These medications were sent to [facility] [address] [facility] [address] AT [facility] &   [address], [facility] [address]     Phone:  [phone]     vitamin D " 85861 UNIT capsule                Primary Care Provider Office Phone # Fax #    Edyta Paulo Campbell [phone]       [address] [place] [address] [address]        Equal Access to Services     LUCY GLASER : Hadii aad ku hadanastacioo Soomaali, waaxda luqadaha, qaybta kaalmada adeegyada, waxstephane adelein hayzehran mauricio vianeyhelio sepulveda. So [facility] [phone].    ATENCIÓN: Si habla español, tiene a dickerson disposición servicios gratuitos de asistencia lingüística. Llame al [phone].    We comply with applicable federal civil rights laws and [place] laws. We do not discriminate on the basis of race, color, national origin, age, disability, sex, sexual orientation, or gender identity.            Thank you!     Thank you for choosing [facility]  for your care. Our goal is always to provide you with excellent care. Hearing back from our patients is one way we can continue to improve our services. Please take a few minutes to complete the written survey that you may receive in the mail after your visit with us. Thank you!             Your Updated Medication List - Protect others around you: Learn how to safely use, store and throw away your medicines at www.disposemymeds.org.          This list is accurate as of: 10/5/17 10:19 AM.  Always use your most recent med list.                   Brand Name Dispense Instructions for use Diagnosis    meclizine 12.5 MG tablet    ANTIVERT    20 tablet    Take 1 tablet (12.5 mg) by mouth 4 times daily as needed for dizziness        VITAMIN D (CHOLECALCIFEROL) PO      Take by mouth daily        vitamin D 57041 UNIT capsule    ERGOCALCIFEROL    8 capsule    Take 1 capsule (50,000 Units) by mouth every 7 days for 8 doses    Vitamin D deficiency

## 2017-10-18 ENCOUNTER — TELEPHONE (OUTPATIENT)
Dept: ENDOCRINOLOGY | Facility: CLINIC | Age: 37
End: 2017-10-18

## 2017-10-18 ENCOUNTER — OFFICE VISIT (OUTPATIENT)
Dept: FAMILY MEDICINE | Facility: CLINIC | Age: 37
End: 2017-10-18
Payer: COMMERCIAL

## 2017-10-18 ENCOUNTER — OFFICE VISIT (OUTPATIENT)
Dept: NEUROSURGERY | Facility: CLINIC | Age: 37
End: 2017-10-18
Attending: NEUROLOGICAL SURGERY
Payer: COMMERCIAL

## 2017-10-18 VITALS
DIASTOLIC BLOOD PRESSURE: 64 MMHG | HEIGHT: 66 IN | OXYGEN SATURATION: 97 % | WEIGHT: 232 LBS | RESPIRATION RATE: 18 BRPM | TEMPERATURE: 99.2 F | SYSTOLIC BLOOD PRESSURE: 101 MMHG | HEART RATE: 92 BPM | BODY MASS INDEX: 37.28 KG/M2

## 2017-10-18 VITALS
OXYGEN SATURATION: 99 % | SYSTOLIC BLOOD PRESSURE: 118 MMHG | HEART RATE: 91 BPM | DIASTOLIC BLOOD PRESSURE: 69 MMHG | TEMPERATURE: 99 F | HEIGHT: 66 IN | BODY MASS INDEX: 36.96 KG/M2 | WEIGHT: 230 LBS

## 2017-10-18 DIAGNOSIS — M54.2 CERVICALGIA: Primary | ICD-10-CM

## 2017-10-18 DIAGNOSIS — Z23 NEED FOR PROPHYLACTIC VACCINATION AND INOCULATION AGAINST INFLUENZA: ICD-10-CM

## 2017-10-18 DIAGNOSIS — R59.9 LYMPH NODE ENLARGEMENT: Primary | ICD-10-CM

## 2017-10-18 PROCEDURE — 99211 OFF/OP EST MAY X REQ PHY/QHP: CPT | Performed by: NURSE PRACTITIONER

## 2017-10-18 PROCEDURE — 99213 OFFICE O/P EST LOW 20 MIN: CPT | Mod: 25 | Performed by: NURSE PRACTITIONER

## 2017-10-18 PROCEDURE — 90471 IMMUNIZATION ADMIN: CPT | Performed by: NURSE PRACTITIONER

## 2017-10-18 PROCEDURE — 90686 IIV4 VACC NO PRSV 0.5 ML IM: CPT | Performed by: NURSE PRACTITIONER

## 2017-10-18 PROCEDURE — 99204 OFFICE O/P NEW MOD 45 MIN: CPT | Performed by: NURSE PRACTITIONER

## 2017-10-18 RX ORDER — DIAZEPAM 10 MG
TABLET ORAL
Refills: 5 | COMMUNITY
Start: 2017-06-26 | End: 2017-10-23

## 2017-10-18 NOTE — PROGRESS NOTES
"HPI    SUBJECTIVE:   Chance Mazariegos is a 36 year old female who presents to clinic today for the following health issues:      Swollen neck and nodule feeling in neck      Duration: 1 week    Description (location/character/radiation): Neck swelling; \"Feels marble nodule in neck\"    Intensity:  mild    Accompanying signs and symptoms: hashimoto's; Neck pain; Swelling    History (similar episodes/previous evaluation): [place] Endocrine [place] of state in the past; Nov 14 is next appointment with Endocrinology    Precipitating or alleviating factors: None    Therapies tried and outcome: Treated in past for hashimoto's        Diagnosed with hashimotos 1.5 years ago \"by accident\" based on CT scan. Then went in for biopsy but the US showed it was hashimotos and didn't get the biopsy.   She started treatment and she can't tolerate her medications for hashimoto's. She ended up in the ER because she was \"racing\" where the resting heart rate was 120. Her TSH at that time was significantly elevated. They tried a tiny dose every 3 days and the same thing happened. She hasn't been on meds for awhile.   Now she is here with concern about a new lump. The left anterior cervical chain she reports is very tender and feels something poking out under the left ear   Hasn't has a scan in a long time     No recent URI but a few days ago started to feel like she was getting sick. \"feeling off\"   No fevers  NO difficulty or pain with swallowing   No nasal symptoms or post nasal drip   Has normal periods   Has gained a little weight   No heart racing       Past Medical History:   Diagnosis Date     Hashimoto's thyroiditis      Past Surgical History:   Procedure Laterality Date     intestinal malrotation surgery 2011 2011    Exploratory Laparotomy     SALPINGECTOMY  2011    Performed at the time of her exploratory laparotomy     Social History   Substance Use Topics     Smoking status: Never Smoker     Smokeless tobacco: Never Used     " "Alcohol use No     Current Outpatient Prescriptions   Medication Sig Dispense Refill     vitamin D (ERGOCALCIFEROL) 23024 UNIT capsule Take 1 capsule (50,000 Units) by mouth every 7 days for 8 doses 8 capsule 3     ferrous sulfate (IRON) 325 (65 FE) MG tablet Take 1 tablet (325 mg) by mouth daily (with breakfast) 90 tablet 3     IBUPROFEN PO        Allergies   Allergen Reactions     Penicillins        Reviewed and updated as needed this visit by clinical staff and provider      ROS  Detailed as above       /64 (BP Location: Left arm, Patient Position: Chair, Cuff Size: Adult Large)  Pulse 92  Temp 99.2  F (37.3  C) (Tympanic)  Resp 18  Ht 5' 6\" (1.676 m)  Wt 232 lb (105.2 kg)  LMP 10/16/2017 (Exact Date)  SpO2 97%  BMI 37.45 kg/m2    Physical Exam   Constitutional: She is well-developed, well-nourished, and in no distress.   HENT:   Head: Normocephalic.   Right Ear: Tympanic membrane, external ear and ear canal normal.   Left Ear: Tympanic membrane, external ear and ear canal normal.   Mouth/Throat: Oropharynx is clear and moist. No oropharyngeal exudate.   Eyes: Conjunctivae are normal.   Neck: Normal range of motion.   Single minimally tender soft, mobile slightly prominent lymph node just inferior to left ear   Cardiovascular: Normal rate, regular rhythm and normal heart sounds.    No murmur heard.  Pulmonary/Chest: Effort normal and breath sounds normal. No respiratory distress.   Lymphadenopathy:     She has no cervical adenopathy.   Neurological: She is alert.   Skin: Skin is warm and dry.   Psychiatric: Affect normal. Her mood appears anxious.   Vitals reviewed.      Assessment and Plan:       ICD-10-CM    1. Lymph node enlargement R59.9    2. Need for prophylactic vaccination and inoculation against influenza Z23 HC FLU VAC PRESRV FREE QUAD SPLIT VIR 3+YRS IM  [25239]          ADMIN VACCINE, ADDL [95633]       1 week of a single slightly enlarged lymph node that is soft and mobile. As she has " not been feeling well, this could be viral related.   She has struggled with hashimotos and has an appt with endocrinology to get established here in [place] as she moved from [place] just over a year ago   We will watch and wait for the next couple weeks. If anything changes, she should call or rtc       ARLEY Jimenes CNP  [facility]

## 2017-10-18 NOTE — NURSING NOTE
"Chief Complaint   Patient presents with     Endocrine Problem       Initial /64 (BP Location: Left arm, Patient Position: Chair, Cuff Size: Adult Large)  Pulse 92  Temp 99.2  F (37.3  C) (Tympanic)  Resp 18  Ht 5' 6\" (1.676 m)  Wt 232 lb (105.2 kg)  LMP 10/16/2017 (Exact Date)  SpO2 97%  BMI 37.45 kg/m2 Estimated body mass index is 37.45 kg/(m^2) as calculated from the following:    Height as of this encounter: 5' 6\" (1.676 m).    Weight as of this encounter: 232 lb (105.2 kg).  Medication Reconciliation: complete   Becka Ceja CMA (AAMA)      "

## 2017-10-18 NOTE — LETTER
"    10/18/2017         RE: Chance Mazariegos  [address] [address]        Dear Colleague,    Thank you for referring your patient, Chance Mazariegos, to the [facility] NEUROSURGERY [facility]IC. Please see a copy of my visit note below.    Dr. Yung Flores  [facility]  Neurosurgery Clinic Visit        CC: Neck pain    Primary care Provider: Edyta Campbell      Reason For Visit:   I was asked by Dr. Deleon to consult on the patient for neck pain.      HPI: Chance Mazariegos is a 36 year old female with neck pain for the past 2 years.  She attributes it to needing a walking boot a couple of years ago and was compensating how she was walking, in turn effecting her neck.  She describes the pain as a constant dull pain along the left lateral neck that increases while working.  She works as a  and states lifting and prolonged positions cause increased pain.  She has had infrequent pain radiating into the left arm, a \"firetype\" sensation that occasionally extends into the hand.  She has relief of her pain with use of heat.  She denies weakness to BUE or issues with gait.  She does have headaches \"sometimes.\" She was seen at the ED 8/28/17 for symptoms of vertigo, head/neck angiogram and head CT normal.  She states symptoms improved after the ED visit.  She does describe feelings \"of wobbly\" 2-3 times per weeks.  She denies changes to bowel or bladder.    Pain at its worst 10+  Pain right now: 4    Past Medical History:   Diagnosis Date     Hashimoto's thyroiditis        Past Medical History reviewed with patient during visit.    Past Surgical History:   Procedure Laterality Date     intestinal malrotation surgery 2011       Past Surgical History reviewed with patient during visit.    Current Outpatient Prescriptions   Medication     vitamin D (ERGOCALCIFEROL) 56222 UNIT capsule     VITAMIN D, CHOLECALCIFEROL, PO     meclizine (ANTIVERT) 12.5 MG tablet     No current " facility-administered medications for this visit.        Allergies   Allergen Reactions     Penicillins        Social History     Social History     Marital status:      Spouse name: N/A     Number of children: N/A     Years of education: N/A     Social History Main Topics     Smoking status: Never Smoker     Smokeless tobacco: Never Used     Alcohol use No     Drug use: No     Sexual activity: Yes     Partners: Female     Other Topics Concern     None     Social History Narrative       Family History   Problem Relation Age of Onset     Neuropathy Mother      CANCER Father           ROS: 10 point ROS neg other than the symptoms noted above in the HPI.    Vital Signs: LMP  (LMP Unknown)    Examination:  Constitutional:  Alert, well nourished, NAD.  Memory: recent and remote memory intact  HEENT: Normocephalic, atraumatic.   Pulm:  Without shortness of breath   CV:  No pitting edema of BLE.    Neurological:  Awake  Alert  Oriented x 3  Speech clear  Cranial nerves II - XII intact      Motor exam   Shoulder Abduction:  Right:  5/5   Left:  5/5  Biceps:                      Right:  5/5   Left:  5/5  Triceps:                     Right:  5/5   Left:  5/5  Wrist Extensors:       Right:  5/5   Left:  5/5  Wrist Flexors:           Right:  5/5   Left:  5/5  Intrinsics:                   Right:  5/5   Left:  5/5  Hip Flexor:                Right: 5/5  Left:  5/5  Hip Adductor:             Right:  5/5  Left:  5/5  Hip Abductor:             Right:  5/5  Left:  5/5  Gastroc Soleus:        Right:  5/5  Left:  5/5  Tib/Ant:                      Right:  5/5  Left:  5/5  EHL:                          Right:  5/5  Left:  5/5   Sensation normal to bilateral upper and lower extremities  Clonus negative  DTRs 2+ symmetric  Gait: Able to stand from a seated position. Normal non-antalgic, non-myelopathic gait.  Able to heel/toe walk without loss of balance  Cervical examination reveals good range of motion.  Tenderness to palpation  "of the cervical spine to the left of midline and left paraspinous muscles bilaterally. Positive facet loading on the left.     Lumbar examination reveals no tenderness of the spine or paraspinous muscles.      Imaging: None of cervical spine within the past year.    Assessment/Plan:   Cervicalgias  Cervical Radiculopathy    Chance Mazariegos is a 36 year old female with neck pain for the past 2 years.  She attributes it to needing a walking boot a couple of years ago and was compensating how she was walking, in turn effecting her neck.  She describes the pain as a constant dull pain along the left lateral neck that increases while working.  She works as a  and states lifting and prolonged positions cause increased pain.  She has had infrequent pain radiating into the left arm, a \"firetype\" sensation that occasionally extends into the hand.  She has relief of her pain with use of heat.  She denies weakness to BUE or issues with gait.  She does have headaches \"sometimes.\" She was seen at the ED 8/28/17 for symptoms of vertigo, head/neck angiogram and head CT normal.  She states symptoms improved after the ED visit.  She does describe feelings \"of wobbly\" 2-3 times per weeks.  We discussed ordering a cervical MRI considering the pain has been ongoing the past 2 years and she feels it is becoming progressively more bothersome.  She will schedule it at her convenience and we will contact her once results received and reviewed.      Patient Instructions   Schedule cervical MRI  We will call you with results  Please contact the clinic if pain persists at [phone].            Jemima Sharif [facility]  [facility]ine [facility]  [address] [address]    Tel [phone]  Pager [phone]      Again, thank you for allowing me to participate in the care of your patient.        Sincerely,        Jemima Sharif, NP    "

## 2017-10-18 NOTE — MR AVS SNAPSHOT
After Visit Summary   10/18/2017    Chance Mazariegos    MRN: [id number]           Patient Information     Date Of Birth          1980        Visit Information        Provider Department      10/18/2017 4:30 PM Phi Denis APRN [facility]        Today's Diagnoses     Lymph node enlargement    -  1    Need for prophylactic vaccination and inoculation against influenza           Follow-ups after your visit        Your next 10 appointments already scheduled     Oct 31, 2017 10:30 AM CDT   US PELVIC COMPLETE W TRANSVAGINAL with WEUS1   [facility] ([facility])    [address] [place] [address]   [phone]           Please bring a list of your medicines (including vitamins, minerals and over-the-counter drugs). Also, tell your doctor about any allergies you may have. Wear comfortable clothes and leave your valuables at home.  Adults: Drink six 8-ounce glasses of fluid one hour before your exam. Do NOT empty your bladder.  If you need to empty your bladder before your exam, try to release only a little bit of urine. Then, drink another 8oz glass of fluid.  Children: Children who are potty trained should drink at least 4 cups (32 oz) of liquid 45 minutes to one hour prior to the exam. The child s bladder must be full in order to achieve a diagnostic exam. If your child is very uncomfortable or has an urgent need to pee, please notify a technologist; they will try to find out how much longer the wait may be and provide instructions to help relieve the pressure. Occasionally it is medically necessary to insert a urinary catheter to fill the bladder.  Please call the Imaging Department at your exam site with any questions.            Oct 31, 2017 11:00 AM CDT   Office Visit with Almita Cheung MD   [facility] ([facility])    [address]  [address]  [place] [id number] [phone]           Bring a current list of meds and any records pertaining to this visit. For Physicals, please bring immunization records and any forms needing to be filled out. Please arrive 10 minutes early to complete paperwork.            2017 12:00 PM CST   (Arrive by 11:45 AM)   NEW ENDOCRINE with Alta Glez MD   [facility] Endocrinology ([facility])    [address]  3rd Floor  [facility] [address] [phone]              Who to contact     If you have questions or need follow up information about today's clinic visit or your schedule please contact [facility] directly at [phone].  Normal or non-critical lab and imaging results will be communicated to you by MyChart, letter or phone within 4 business days after the clinic has received the results. If you do not hear from us within 7 days, please contact the clinic through MyChart or phone. If you have a critical or abnormal lab result, we will notify you by phone as soon as possible.  Submit refill requests through Invisible or call your pharmacy and they will forward the refill request to us. Please allow 3 business days for your refill to be completed.          Additional Information About Your Visit        Invisible Information     Invisible lets you send messages to your doctor, view your test results, renew your prescriptions, schedule appointments and more. To sign up, go to [email or web address] . Click on \"Log in\" on the left side of the screen, which will take you to the Welcome page. Then click on \"Sign up Now\" on the right side of the page.     You will be asked to enter the access code listed below, as well as some personal information. Please follow the directions to create your username and password.     Your access code is: [email or web address]  Expires: 2017  2:26 AM     Your access code will  in 90 days. If you need help or a new code, please " "call your [facility] or [phone].        Care EveryWhere ID     This is your Care EveryWhere ID. This could be used by other organizations to access your [facility] medical records  [id number]        Your Vitals Were     Pulse Temperature Respirations Height Last Period Pulse Oximetry    92 99.2  F (37.3  C) (Tympanic) 18 5' 6\" (1.676 m) 10/16/2017 (Exact Date) 97%    BMI (Body Mass Index)                   37.45 kg/m2            Blood Pressure from Last 3 Encounters:   10/23/17 104/70   10/23/17 104/70   10/18/17 101/64    Weight from Last 3 Encounters:   10/23/17 226 lb (102.5 kg)   10/23/17 226 lb 12.8 oz (102.9 kg)   10/18/17 232 lb (105.2 kg)              We Performed the Following          ADMIN VACCINE, ADDL [48365]     HC FLU VAC PRESRV FREE QUAD SPLIT VIR 3+YRS IM  [31445]          Today's Medication Changes          These changes are accurate as of: 10/18/17 11:59 PM.  If you have any questions, ask your nurse or doctor.               Stop taking these medicines if you haven't already. Please contact your care team if you have questions.     meclizine 12.5 MG tablet   Commonly known as:  ANTIVERT   Stopped by:  Phi Denis APRN CNP           VITAMIN D (CHOLECALCIFEROL) PO   Stopped by:  Phi Denis APRN CNP                    Primary Care Provider Office Phone # Fax #    Edyta Paulo Campbell [phone]       [address] [address]        Equal Access to Services     [facility] : Hadlovely [place], kwabena patel, qaybta mandoalhortencia peña. So [facility] [phone].    ATENCIÓN: Si habla español, tiene a dickerson disposición servicios gratuitos de asistencia lingüística. Llame al [phone].    We comply with applicable federal civil rights laws and [place] laws. We do not discriminate on the basis of race, color, national origin, age, disability, sex, sexual orientation, or " gender identity.            Thank you!     Thank you for choosing [facility]  for your care. Our goal is always to provide you with excellent care. Hearing back from our patients is one way we can continue to improve our services. Please take a few minutes to complete the written survey that you may receive in the mail after your visit with us. Thank you!             Your Updated Medication List - Protect others around you: Learn how to safely use, store and throw away your medicines at www.disposemymeds.org.          This list is accurate as of: 10/18/17 11:59 PM.  Always use your most recent med list.                   Brand Name Dispense Instructions for use Diagnosis    vitamin D 73490 UNIT capsule    ERGOCALCIFEROL    8 capsule    Take 1 capsule (50,000 Units) by mouth every 7 days for 8 doses    Vitamin D deficiency

## 2017-10-18 NOTE — MR AVS SNAPSHOT
After Visit Summary   10/18/2017    Chance Mazariegos    MRN: [id number]           Patient Information     Date Of Birth          1980        Visit Information        Provider Department      10/18/2017 2:50 PM Jemima Sharif NP [facility]        Today's Diagnoses     Cervicalgia    -  1      Care Instructions    Schedule cervical MRI  We will call you with results  Please contact the clinic if pain persists at [phone].            Follow-ups after your visit        Your next 10 appointments already scheduled     Oct 18, 2017  4:30 PM CDT   Office Visit with ARLEY Silva [facility] ([facility])    [address] [id number] [phone]           Bring a current list of meds and any records pertaining to this visit. For Physicals, please bring immunization records and any forms needing to be filled out. Please arrive 10 minutes early to complete paperwork.            Oct 23, 2017 11:00 AM CDT   PHYSICAL with Almita Cheung MD   [facility]    [address] [address]   [phone]            Oct 23, 2017  2:00 PM CDT   New Visit with Dandre Cardenas MD   [facility] ORTHOPEDIC SURGERY ([facility])    [address]  [facility] [address]   [phone]            Nov 14, 2017 12:00 PM CST   (Arrive by 11:45 AM)   NEW ENDOCRINE with Alta Glez MD   [facility] ([facility])    [address] [place] [address] [phone]              Future tests that were ordered for you today     Open Future Orders        Priority Expected Expires Ordered    MR Cervical Spine w/o Contrast Routine  10/18/2018 10/18/2017            Who to contact     If you have questions or need follow up information about  "today's clinic visit or your schedule please contact [facility] NEUROSURGERY [facility] directly at [phone].  Normal or non-critical lab and imaging results will be communicated to you by CirroSecurehart, letter or phone within 4 business days after the clinic has received the results. If you do not hear from us within 7 days, please contact the clinic through CirroSecurehart or phone. If you have a critical or abnormal lab result, we will notify you by phone as soon as possible.  Submit refill requests through [employer] or call your pharmacy and they will forward the refill request to us. Please allow 3 business days for your refill to be completed.          Additional Information About Your Visit        [employer] Information     [employer] Platform lets you send messages to your doctor, view your test results, renew your prescriptions, schedule appointments and more. To sign up, go to [email or web address] . Click on \"Log in\" on the left side of the screen, which will take you to the Welcome page. Then click on \"Sign up Now\" on the right side of the page.     You will be asked to enter the access code listed below, as well as some personal information. Please follow the directions to create your username and password.     Your access code is: [email or web address]  Expires: 2017  2:26 AM     Your access code will  in 90 days. If you need help or a new code, please call your [facility] clinic or [phone].        Care EveryWhere ID     This is your Care EveryWhere ID. This could be used by other organizations to access your [facility] medical records  [id number]        Your Vitals Were     Pulse Temperature Height Last Period Pulse Oximetry Breastfeeding?    91 99  F (37.2  C) (Oral) 5' 6\" (1.676 m) 10/16/2017 (Exact Date) 99% No    BMI (Body Mass Index)                   37.12 kg/m2            Blood Pressure from Last 3 Encounters:   10/18/17 118/69   10/05/17 110/74   17 122/69    Weight from Last 3 Encounters:   10/18/17 " 230 lb (104.3 kg)   10/05/17 228 lb (103.4 kg)   08/28/17 230 lb (104.3 kg)               Primary Care Provider Office Phone # Fax #    Edyta Paulo Campbell [phone]       [facility] [address]  [facility] [address]        Equal Access to Services     [facility] JAILYN : Hadii aad ku hadasho Soomaali, waaxda luqadaha, qaybta kaalmada adeegyada, waxay idiin hayaan adeeg khmaricarmenhelio la'aan . So [facility] [phone].    ATENCIÓN: Si habla español, tiene a dickerson disposición servicios gratuitos de asistencia lingüística. Llame al [phone].    We comply with applicable federal civil rights laws and [place] laws. We do not discriminate on the basis of race, color, national origin, age, disability, sex, sexual orientation, or gender identity.            Thank you!     Thank you for choosing [facility]UROSUR[facility]  for your care. Our goal is always to provide you with excellent care. Hearing back from our patients is one way we can continue to improve our services. Please take a few minutes to complete the written survey that you may receive in the mail after your visit with us. Thank you!             Your Updated Medication List - Protect others around you: Learn how to safely use, store and throw away your medicines at www.disposemymeds.org.          This list is accurate as of: 10/18/17  3:24 PM.  Always use your most recent med list.                   Brand Name Dispense Instructions for use Diagnosis    meclizine 12.5 MG tablet    ANTIVERT    20 tablet    Take 1 tablet (12.5 mg) by mouth 4 times daily as needed for dizziness        VITAMIN D (CHOLECALCIFEROL) PO      Take by mouth daily        vitamin D 47286 UNIT capsule    ERGOCALCIFEROL    8 capsule    Take 1 capsule (50,000 Units) by mouth every 7 days for 8 doses    Vitamin D deficiency

## 2017-10-18 NOTE — PATIENT INSTRUCTIONS
Schedule cervical MRI  We will call you with results  Please contact the clinic if pain persists at [phone].

## 2017-10-18 NOTE — TELEPHONE ENCOUNTER
Pt called with symptoms of swollen throat, lumps on throat difficulty swallowing. Pt reports no trouble with breathing. Pt has not been seen in our clinic and referred pt to PCP. Reviewed with CORA Shepard and avised pt to seek emergency medical care if she is unable to breath or swallow.

## 2017-10-18 NOTE — NURSING NOTE
"Chance Mazariegos is a 36 year old female who presents for:  Chief Complaint   Patient presents with     Neurologic Problem     referral from [facility] for neck pain        Initial Vitals:  LMP  (LMP Unknown) Estimated body mass index is 35.71 kg/(m^2) as calculated from the following:    Height as of 10/5/17: 5' 7\" (1.702 m).    Weight as of 10/5/17: 228 lb (103.4 kg).. There is no height or weight on file to calculate BSA. BP completed using cuff size: large  Data Unavailable    Do you feel safe in your environment?  Yes  Do you need any refills today? No    Nursing Comments: referral from [facility] for neck pain.  Patient rates her pain today as 4-5      5 min. nursing intake time  Erendira Carrion CMA, AAS      Discharge plan:   See providers dictation   2 min. nursing discharge time  Erendira Carrion CMA, AAS       "

## 2017-10-18 NOTE — TELEPHONE ENCOUNTER
We have not seen Chance in  clinic  Yet to access. If she is having trouble breathing she will need to go to the ER. Her PCP will need to  contact Endocrine consult if she feels she needs to be seen sooner.  Otherwise Chance should see her PCP until seen here. IF sooner is needed  The PCP  Will need to contact on call.

## 2017-10-18 NOTE — PROGRESS NOTES
"Dr. Yung Flores  [facility]  Neurosurgery Clinic Visit        CC: Neck pain    Primary care Provider: Edyta Campbell      Reason For Visit:   I was asked by Dr. Deleon to consult on the patient for neck pain.      HPI: Chance Mazariegos is a 36 year old female with neck pain for the past 2 years.  She attributes it to needing a walking boot a couple of years ago and was compensating how she was walking, in turn effecting her neck.  She describes the pain as a constant dull pain along the left lateral neck that increases while working.  She works as a  and states lifting and prolonged positions cause increased pain.  She has had infrequent pain radiating into the left arm, a \"firetype\" sensation that occasionally extends into the hand.  She has relief of her pain with use of heat.  She denies weakness to BUE or issues with gait.  She does have headaches \"sometimes.\" She was seen at the ED 8/28/17 for symptoms of vertigo, head/neck angiogram and head CT normal.  She states symptoms improved after the ED visit.  She does describe feelings \"of wobbly\" 2-3 times per weeks.  She denies changes to bowel or bladder.    Pain at its worst 10+  Pain right now: 4    Past Medical History:   Diagnosis Date     Hashimoto's thyroiditis        Past Medical History reviewed with patient during visit.    Past Surgical History:   Procedure Laterality Date     intestinal malrotation surgery 2011       Past Surgical History reviewed with patient during visit.    Current Outpatient Prescriptions   Medication     vitamin D (ERGOCALCIFEROL) 54051 UNIT capsule     VITAMIN D, CHOLECALCIFEROL, PO     meclizine (ANTIVERT) 12.5 MG tablet     No current facility-administered medications for this visit.        Allergies   Allergen Reactions     Penicillins        Social History     Social History     Marital status:      Spouse name: N/A     Number of children: N/A     Years of education: N/A     Social " History Main Topics     Smoking status: Never Smoker     Smokeless tobacco: Never Used     Alcohol use No     Drug use: No     Sexual activity: Yes     Partners: Female     Other Topics Concern     None     Social History Narrative       Family History   Problem Relation Age of Onset     Neuropathy Mother      CANCER Father           ROS: 10 point ROS neg other than the symptoms noted above in the HPI.    Vital Signs: LMP  (LMP Unknown)    Examination:  Constitutional:  Alert, well nourished, NAD.  Memory: recent and remote memory intact  HEENT: Normocephalic, atraumatic.   Pulm:  Without shortness of breath   CV:  No pitting edema of BLE.    Neurological:  Awake  Alert  Oriented x 3  Speech clear  Cranial nerves II - XII intact      Motor exam   Shoulder Abduction:  Right:  5/5   Left:  5/5  Biceps:                      Right:  5/5   Left:  5/5  Triceps:                     Right:  5/5   Left:  5/5  Wrist Extensors:       Right:  5/5   Left:  5/5  Wrist Flexors:           Right:  5/5   Left:  5/5  Intrinsics:                   Right:  5/5   Left:  5/5  Hip Flexor:                Right: 5/5  Left:  5/5  Hip Adductor:             Right:  5/5  Left:  5/5  Hip Abductor:             Right:  5/5  Left:  5/5  Gastroc Soleus:        Right:  5/5  Left:  5/5  Tib/Ant:                      Right:  5/5  Left:  5/5  EHL:                          Right:  5/5  Left:  5/5   Sensation normal to bilateral upper and lower extremities  Clonus negative  DTRs 2+ symmetric  Gait: Able to stand from a seated position. Normal non-antalgic, non-myelopathic gait.  Able to heel/toe walk without loss of balance  Cervical examination reveals good range of motion.  Tenderness to palpation of the cervical spine to the left of midline and left paraspinous muscles bilaterally. Positive facet loading on the left.     Lumbar examination reveals no tenderness of the spine or paraspinous muscles.      Imaging: None of cervical spine within the past  "year.    Assessment/Plan:   Cervicalgias  Cervical Radiculopathy    Chance Mazariegos is a 36 year old female with neck pain for the past 2 years.  She attributes it to needing a walking boot a couple of years ago and was compensating how she was walking, in turn effecting her neck.  She describes the pain as a constant dull pain along the left lateral neck that increases while working.  She works as a  and states lifting and prolonged positions cause increased pain.  She has had infrequent pain radiating into the left arm, a \"firetype\" sensation that occasionally extends into the hand.  She has relief of her pain with use of heat.  She denies weakness to BUE or issues with gait.  She does have headaches \"sometimes.\" She was seen at the ED 8/28/17 for symptoms of vertigo, head/neck angiogram and head CT normal.  She states symptoms improved after the ED visit.  She does describe feelings \"of wobbly\" 2-3 times per weeks.  We discussed ordering a cervical MRI considering the pain has been ongoing the past 2 years and she feels it is becoming progressively more bothersome.  She will schedule it at her convenience and we will contact her once results received and reviewed.      Patient Instructions   Schedule cervical MRI  We will call you with results  Please contact the clinic if pain persists at [phone].            Jemima Sharif [facility]  [address] [address]    Tel [phone]  Pager [phone]    "

## 2017-10-23 ENCOUNTER — RADIANT APPOINTMENT (OUTPATIENT)
Dept: GENERAL RADIOLOGY | Facility: CLINIC | Age: 37
End: 2017-10-23
Attending: ORTHOPAEDIC SURGERY
Payer: COMMERCIAL

## 2017-10-23 ENCOUNTER — OFFICE VISIT (OUTPATIENT)
Dept: OBGYN | Facility: CLINIC | Age: 37
End: 2017-10-23
Payer: COMMERCIAL

## 2017-10-23 ENCOUNTER — OFFICE VISIT (OUTPATIENT)
Dept: ORTHOPEDICS | Facility: CLINIC | Age: 37
End: 2017-10-23
Payer: COMMERCIAL

## 2017-10-23 VITALS
WEIGHT: 226 LBS | SYSTOLIC BLOOD PRESSURE: 104 MMHG | HEIGHT: 67 IN | DIASTOLIC BLOOD PRESSURE: 70 MMHG | BODY MASS INDEX: 35.47 KG/M2

## 2017-10-23 VITALS
SYSTOLIC BLOOD PRESSURE: 104 MMHG | BODY MASS INDEX: 35.6 KG/M2 | HEART RATE: 78 BPM | HEIGHT: 67 IN | DIASTOLIC BLOOD PRESSURE: 70 MMHG | WEIGHT: 226.8 LBS

## 2017-10-23 DIAGNOSIS — D64.9 ANEMIA, UNSPECIFIED TYPE: ICD-10-CM

## 2017-10-23 DIAGNOSIS — M25.552 LEFT HIP PAIN: ICD-10-CM

## 2017-10-23 DIAGNOSIS — E06.3 HYPOTHYROIDISM DUE TO HASHIMOTO'S THYROIDITIS: ICD-10-CM

## 2017-10-23 DIAGNOSIS — Z13.220 SCREENING FOR LIPID DISORDERS: ICD-10-CM

## 2017-10-23 DIAGNOSIS — M25.852 LEFT HIP IMPINGEMENT SYNDROME: Primary | ICD-10-CM

## 2017-10-23 DIAGNOSIS — E66.9 OBESITY (BMI 35.0-39.9 WITHOUT COMORBIDITY): ICD-10-CM

## 2017-10-23 DIAGNOSIS — N92.0 MENORRHAGIA WITH REGULAR CYCLE: ICD-10-CM

## 2017-10-23 DIAGNOSIS — Z01.419 ENCOUNTER FOR GYNECOLOGICAL EXAMINATION WITHOUT ABNORMAL FINDING: Primary | ICD-10-CM

## 2017-10-23 LAB
FOLATE SERPL-MCNC: 12.2 NG/ML
GLUCOSE BLD-MCNC: 89 MG/DL (ref 70–99)
HGB BLD-MCNC: 12.4 G/DL (ref 11.7–15.7)
VIT B12 SERPL-MCNC: 209 PG/ML (ref 193–986)

## 2017-10-23 PROCEDURE — 82728 ASSAY OF FERRITIN: CPT | Performed by: OBSTETRICS & GYNECOLOGY

## 2017-10-23 PROCEDURE — 83550 IRON BINDING TEST: CPT | Performed by: OBSTETRICS & GYNECOLOGY

## 2017-10-23 PROCEDURE — 85018 HEMOGLOBIN: CPT | Performed by: OBSTETRICS & GYNECOLOGY

## 2017-10-23 PROCEDURE — 82947 ASSAY GLUCOSE BLOOD QUANT: CPT | Performed by: OBSTETRICS & GYNECOLOGY

## 2017-10-23 PROCEDURE — 99203 OFFICE O/P NEW LOW 30 MIN: CPT | Performed by: ORTHOPAEDIC SURGERY

## 2017-10-23 PROCEDURE — 73502 X-RAY EXAM HIP UNI 2-3 VIEWS: CPT | Performed by: ORTHOPAEDIC SURGERY

## 2017-10-23 PROCEDURE — 83540 ASSAY OF IRON: CPT | Performed by: OBSTETRICS & GYNECOLOGY

## 2017-10-23 PROCEDURE — G0145 SCR C/V CYTO,THINLAYER,RESCR: HCPCS | Performed by: OBSTETRICS & GYNECOLOGY

## 2017-10-23 PROCEDURE — 84439 ASSAY OF FREE THYROXINE: CPT | Performed by: OBSTETRICS & GYNECOLOGY

## 2017-10-23 PROCEDURE — 36415 COLL VENOUS BLD VENIPUNCTURE: CPT | Performed by: OBSTETRICS & GYNECOLOGY

## 2017-10-23 PROCEDURE — 84443 ASSAY THYROID STIM HORMONE: CPT | Performed by: OBSTETRICS & GYNECOLOGY

## 2017-10-23 PROCEDURE — 99213 OFFICE O/P EST LOW 20 MIN: CPT | Mod: 25 | Performed by: OBSTETRICS & GYNECOLOGY

## 2017-10-23 PROCEDURE — 82746 ASSAY OF FOLIC ACID SERUM: CPT | Performed by: OBSTETRICS & GYNECOLOGY

## 2017-10-23 PROCEDURE — 82607 VITAMIN B-12: CPT | Performed by: OBSTETRICS & GYNECOLOGY

## 2017-10-23 PROCEDURE — 80061 LIPID PANEL: CPT | Performed by: OBSTETRICS & GYNECOLOGY

## 2017-10-23 PROCEDURE — 87624 HPV HI-RISK TYP POOLED RSLT: CPT | Performed by: OBSTETRICS & GYNECOLOGY

## 2017-10-23 PROCEDURE — 99385 PREV VISIT NEW AGE 18-39: CPT | Performed by: OBSTETRICS & GYNECOLOGY

## 2017-10-23 ASSESSMENT — PATIENT HEALTH QUESTIONNAIRE - PHQ9
SUM OF ALL RESPONSES TO PHQ QUESTIONS 1-9: 5
5. POOR APPETITE OR OVEREATING: SEVERAL DAYS

## 2017-10-23 ASSESSMENT — ANXIETY QUESTIONNAIRES
1. FEELING NERVOUS, ANXIOUS, OR ON EDGE: SEVERAL DAYS
3. WORRYING TOO MUCH ABOUT DIFFERENT THINGS: SEVERAL DAYS
6. BECOMING EASILY ANNOYED OR IRRITABLE: NOT AT ALL
2. NOT BEING ABLE TO STOP OR CONTROL WORRYING: SEVERAL DAYS
7. FEELING AFRAID AS IF SOMETHING AWFUL MIGHT HAPPEN: SEVERAL DAYS
IF YOU CHECKED OFF ANY PROBLEMS ON THIS QUESTIONNAIRE, HOW DIFFICULT HAVE THESE PROBLEMS MADE IT FOR YOU TO DO YOUR WORK, TAKE CARE OF THINGS AT HOME, OR GET ALONG WITH OTHER PEOPLE: NOT DIFFICULT AT ALL
GAD7 TOTAL SCORE: 5
5. BEING SO RESTLESS THAT IT IS HARD TO SIT STILL: NOT AT ALL

## 2017-10-23 NOTE — MR AVS SNAPSHOT
After Visit Summary   10/23/2017    Chance Mazariegos    MRN: [id number]           Patient Information     Date Of Birth          1980        Visit Information        Provider Department      10/23/2017 11:00 AM Almita Cheung MD [facility]        Today's Diagnoses     Encounter for gynecological examination without abnormal finding    -  1    Anemia, unspecified type        Hypothyroidism due to Hashimoto's thyroiditis        Screening for lipid disorders        Obesity (BMI 35.0-39.9 without comorbidity)           Follow-ups after your visit        Your next 10 appointments already scheduled     Oct 23, 2017  2:00 PM CDT   New Visit with Dandre Cardenas MD   [facility] ([facility])    [address]  [address]  [facility] [address]   [phone]            Oct 24, 2017  8:00 AM CDT   MR CERVICAL SPINE W/O CONTRAST with SHMRP2   [facility] ([facility])    [address] [facility] [address] [phone]           Take your medicines as usual, unless your doctor tells you not to. Bring a list of your current medicines to your exam (including vitamins, minerals and over-the-counter drugs). Also bring the results of similar scans you may have had.  Please remove any body piercings and hair extensions before you arrive.  Follow your doctor s orders. If you do not, we may have to postpone your exam.  You will not have contrast for this exam. You do not need to do anything special to prepare.  The MRI machine uses a strong magnet. Please wear clothes without metal (snaps, zippers). A sweatsuit works well, or we may give you a hospital gown.   **IMPORTANT** THE INSTRUCTIONS BELOW ARE ONLY FOR THOSE PATIENTS WHO HAVE BEEN TOLD THEY WILL RECEIVE SEDATION OR GENERAL ANESTHESIA DURING THEIR MRI PROCEDURE:  IF YOU WILL RECEIVE SEDATION (take medicine to help you relax during your  exam):   You must get the medicine from your doctor before you arrive. Bring the medicine to the exam. Do not take it at home.   Arrive one hour early. Bring someone who can take you home after the test. Your medicine will make you sleepy. After the exam, you may not drive, take a bus or take a taxi by yourself.   No eating 8 hours before your exam. You may have clear liquids up until 4 hours before your exam. (Clear liquids include water, clear tea, black coffee and fruit juice without pulp.)  IF YOU WILL RECEIVE ANESTHESIA (be asleep for your exam):   Arrive 1 1/2 hours early. Bring someone who can take you home after the test. You may not drive, take a bus or take a taxi by yourself.   No eating 8 hours before your exam. You may have clear liquids up until 4 hours before your exam. (Clear liquids include water, clear tea, black coffee and fruit juice without pulp.)   You will spend four to five hours in the recovery room.  Please call the Imaging Department at your exam site with any questions.            Oct 31, 2017 10:30 AM CDT   US PELVIC COMPLETE W TRANSVAGINAL with WEUS1   [facility] ([facility])    [address] [place] [address] [phone]           Please bring a list of your medicines (including vitamins, minerals and over-the-counter drugs). Also, tell your doctor about any allergies you may have. Wear comfortable clothes and leave your valuables at home.  Adults: Drink six 8-ounce glasses of fluid one hour before your exam. Do NOT empty your bladder.  If you need to empty your bladder before your exam, try to release only a little bit of urine. Then, drink another 8oz glass of fluid.  Children: Children who are potty trained should drink at least 4 cups (32 oz) of liquid 45 minutes to one hour prior to the exam. The child s bladder must be full in order to achieve a diagnostic exam. If your child is very uncomfortable or has an  urgent need to pee, please notify a technologist; they will try to find out how much longer the wait may be and provide instructions to help relieve the pressure. Occasionally it is medically necessary to insert a urinary catheter to fill the bladder.  Please call the Imaging Department at your exam site with any questions.            Oct 31, 2017 11:00 AM CDT   Office Visit with Almita Cheung MD   [facility] ([facility])    [address] [place] [id number] [phone]           Bring a current list of meds and any records pertaining to this visit. For Physicals, please bring immunization records and any forms needing to be filled out. Please arrive 10 minutes early to complete paperwork.            Nov 14, 2017 12:00 PM CST   (Arrive by 11:45 AM)   NEW ENDOCRINE with Alta Glez MD   [facility] ([facility])    [address] [place] [address] [phone]              Who to contact     If you have questions or need follow up information about today's clinic visit or your schedule please contact [facility] directly at [phone].  Normal or non-critical lab and imaging results will be communicated to you by MyChart, letter or phone within 4 business days after the clinic has received the results. If you do not hear from us within 7 days, please contact the clinic through [employer] or phone. If you have a critical or abnormal lab result, we will notify you by phone as soon as possible.  Submit refill requests through [employer] or call your pharmacy and they will forward the refill request to us. Please allow 3 business days for your refill to be completed.          Additional Information About Your Visit        [employer] Information     [employer] lets you send messages to your doctor, view your test results, renew your prescriptions, schedule  "appointments and more. To sign up, go to [email or web address] . Click on \"Log in\" on the left side of the screen, which will take you to the Welcome page. Then click on \"Sign up Now\" on the right side of the page.     You will be asked to enter the access code listed below, as well as some personal information. Please follow the directions to create your username and password.     Your access code is: [email or web address]  Expires: 2017  2:26 AM     Your access code will  in 90 days. If you need help or a new code, please call your [place] clinic or [phone].        Care EveryWhere ID     This is your Care EveryWhere ID. This could be used by other organizations to access your [place] medical records  [id number]        Your Vitals Were     Pulse Height Last Period BMI (Body Mass Index)          78 5' 7\" (1.702 m) 10/16/2017 (Exact Date) 35.52 kg/m2         Blood Pressure from Last 3 Encounters:   10/23/17 104/70   10/18/17 101/64   10/18/17 118/69    Weight from Last 3 Encounters:   10/23/17 226 lb 12.8 oz (102.9 kg)   10/18/17 232 lb (105.2 kg)   10/18/17 230 lb (104.3 kg)              We Performed the Following     Ferritin     Folate     Glucose, whole blood     Hemoglobin     HPV High Risk Types DNA Cervical     Iron and iron binding capacity     Lipid Profile (Chol, Trig, HDL, LDL calc)     Pap imaged thin layer screen with HPV - recommended age 30 - 65     TSH with free T4 reflex     Vitamin B12        Primary Care Provider Office Phone # Fax #    Edyta Paulo Campbell [phone]       [facility] [address] [facility] [address]  [place] [address]        Equal Access to Services     LUCY GLASER : Josias Ashford, kwabena patel, hortencia reynaga. So [facility] [phone].    ATENCIÓN: Si habla español, tiene a dickerson disposición servicios gratuitos de asistencia lingüística. Pipo al [phone].    We comply with applicable " federal civil rights laws and [place] laws. We do not discriminate on the basis of race, color, national origin, age, disability, sex, sexual orientation, or gender identity.            Thank you!     Thank you for choosing [facility]  for your care. Our goal is always to provide you with excellent care. Hearing back from our patients is one way we can continue to improve our services. Please take a few minutes to complete the written survey that you may receive in the mail after your visit with us. Thank you!             Your Updated Medication List - Protect others around you: Learn how to safely use, store and throw away your medicines at www.disposemymeds.org.          This list is accurate as of: 10/23/17 12:03 PM.  Always use your most recent med list.                   Brand Name Dispense Instructions for use Diagnosis    vitamin D 52763 UNIT capsule    ERGOCALCIFEROL    8 capsule    Take 1 capsule (50,000 Units) by mouth every 7 days for 8 doses    Vitamin D deficiency

## 2017-10-23 NOTE — PROGRESS NOTES
HISTORY OF PRESENT ILLNESS:    Chance Mazariegos is a 36 year old female who is seen in consultation at the request of Dr. Campbell for left hip pain    Present symptoms: Pt states hip pain has been present for a couple months. Describes pain as very sharp when doing figure 4 motion.  Pt states she will have anterior groin pain when deeper into the figure 4; will have lateral hip pain when starting the motion.   She is undergoing a workup for her neck. But she denies any low back issues.  No symptom changes related to Valsalva maneuvers. She denies any  Bowel or bladder function changes. She denies radicular symptoms.  Pain is in the groin area with flexion and external rotation of the hip. Sometimes she can have pain in the lateral aspect.  Treatments tried to this point: stretching  Orthopedic PMH: no ortho surgeries; hx of Left plantar fascia rupture     Past Medical History:   Diagnosis Date     Hashimoto's thyroiditis        Past Surgical History:   Procedure Laterality Date     intestinal malrotation surgery 2011 2011    Exploratory Laparotomy     SALPINGECTOMY  2011    Performed at the time of her exploratory laparotomy       Family History   Problem Relation Age of Onset     Neuropathy Mother      Thyroid Disease Mother      CANCER Father      throat cancer     CEREBROVASCULAR DISEASE Maternal Grandmother      Thyroid Disease Maternal Grandmother        Social History     Social History     Marital status:      Spouse name: N/A     Number of children: N/A     Years of education: N/A     Occupational History     Not on file.     Social History Main Topics     Smoking status: Never Smoker     Smokeless tobacco: Never Used     Alcohol use No     Drug use: No     Sexual activity: Yes     Partners: Female     Birth control/ protection: None     Other Topics Concern     Not on file     Social History Narrative    Same Sex relationship. Partner is Beatrice. Moved to [place] from [place] after inheriting a house  "from Beatrice's relative. Looking into adoption. Beatrice had endometrial hyperplasia after miscarriage with Chime's egg.     Denies intimate partner violence.       Current Outpatient Prescriptions   Medication Sig Dispense Refill     vitamin D (ERGOCALCIFEROL) 46312 UNIT capsule Take 1 capsule (50,000 Units) by mouth every 7 days for 8 doses 8 capsule 3     IBUPROFEN PO          Allergies   Allergen Reactions     Penicillins        REVIEW OF SYSTEMS:  CONSTITUTIONAL:  NEGATIVE for fever, chills, change in weight  INTEGUMENTARY/SKIN:  NEGATIVE for worrisome rashes, moles or lesions  EYES:  NEGATIVE for vision changes or irritation  ENT/MOUTH:  NEGATIVE for ear, mouth and throat problems  RESP:  NEGATIVE for significant cough or SOB  BREAST:  NEGATIVE for masses, tenderness or discharge  CV:  NEGATIVE for chest pain, palpitations or peripheral edema  GI:  NEGATIVE for nausea, abdominal pain, heartburn, or change in bowel habits  :  Negative   MUSCULOSKELETAL:  See HPI above  NEURO:  NEGATIVE for weakness, dizziness or paresthesias  ENDOCRINE:  NEGATIVE for temperature intolerance, skin/hair changes  HEME/ALLERGY/IMMUNE:  NEGATIVE for bleeding problems  PSYCHIATRIC:  NEGATIVE for changes in mood or affect      PHYSICAL EXAM:  /70  Ht 5' 7\" (1.702 m)  Wt 226 lb (102.5 kg)  LMP 10/16/2017 (Exact Date)  BMI 35.4 kg/m2  Body mass index is 35.4 kg/(m^2).   GENERAL APPEARANCE: healthy, alert and no distress   HEENT: No apparent thyroid megaly. Clear sclera with normal ocular movement  RESPIRATORY: No labored breathing  SKIN: no suspicious lesions or rashes  NEURO: Normal strength and tone, mentation intact and speech normal  VASCULAR: Good pulses, and capillary refill   LYMPH: no lymphadenopathy   PSYCH:  mentation appears normal and affect normal/bright    MUSCULOSKELETAL:  She is able to get up from sitting readily  No limping noted  Full range of motion of the hips  Straight leg raising is negative  Greater " trochanter region is not particular tender  Pain with about 70 of flexion and external rotation  However, the flexion and internal rotation does not cause pain.  Similarly, hyperextension and external rotation does not cause any pain.  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 Findings a thoroughly explained.  Based on her symptoms and lack of significant pathology on x-rays, most likely she has mild impingement phenomenon and/or labral pathology.  As a starting point, is that of immediate surgical intervention,physical therapy for hip musculature stretching was felt to be very reasonable.  If she has continuing pain beyond three months of doing stretching exercises, we will consider further evaluation of the situation with MRI scan arthrogram. She can either come back or call us to make that arrangement.  Physical therapy referral was made today.    Imaging Interpretation:   x-rays of the pelvis and lateral left hip. No significant pathologies are noted other than minimal prominence of the lateral femoral head and suggestion of less than ideal  Anteversion of the acetabulum on the lateral view. No acute fractures or other osseous pathology noted. Hip joint spaces are well maintained.      Dandre Cardenas MD  Department of Orthopedic Surgery        Disclaimer: This note consists of symbols derived from keyboarding, dictation and/or voice recognition software. As a result, there may be errors in the script that have gone undetected. Please consider this when interpreting information found in this chart.

## 2017-10-23 NOTE — NURSING NOTE
"Chief Complaint   Patient presents with     Hip Pain     Left hip pain       Initial /70  Ht 5' 7\" (1.702 m)  Wt 226 lb (102.5 kg)  LMP 10/16/2017 (Exact Date)  BMI 35.4 kg/m2 Estimated body mass index is 35.4 kg/(m^2) as calculated from the following:    Height as of this encounter: 5' 7\" (1.702 m).    Weight as of this encounter: 226 lb (102.5 kg).  Medication Reconciliation: complete    "

## 2017-10-23 NOTE — PROGRESS NOTES
"  Chance is a 36 year old G0 female who presents for annual exam.     Besides routine health maintenance, she would like to discuss thyroid issues.     HPI:  The patient's PCP is Edyta Campbell MD.   Chance is a recent transplant with her partner from [place] after her partner inherited a house from her relative. She presents for an annual examination but is concerned about her untreated thyroid issues. She states that she was recently informed of her anemia. She endorses regular but heavy menses. They occur once a month, last 5 days. She has not been tracking the exact spacing of her menstrual cycles. She states that the first two days are so heavy that she ruins underwear every month. She sleeps with a towel on the bed at night because she will inevitably bleed through her clothes. She does not use tampons any longer because she bleeds through them.  She denies any prior history of fibroids. She had one of her fallopian tubes removed at the time of her exploratory laparotomy for intestinal malrotation due to the presence of a cyst. She states that her mother experienced menopause at the age of 42. She has not been having any hot flushes or night sweats and wonders if there is a way to check for being close to menopause. Of note, she underwent initial testing as she and [place] wanted to conceive and she was told she made \"very little eggs.\"  Regarding her thyroid disease she states that she was diagnosed with Hashimoto's thyroiditis during her infertility work up. She tried levothyroxine but ended up with tachycardia twice even after the dose was decreased. She has not been on any medication for this and has an appointment with an Endocrinologist on 11/14.    GYNECOLOGIC HISTORY:    Patient's last menstrual period was 10/16/2017 (exact date).  Her current contraception method is: none.  She  reports that she has never smoked. She has never used smokeless tobacco.      Patient is sexually active.  STD testing offered? "  Declined  Last PHQ-9 score on record =   PHQ-9 SCORE 10/23/2017   Total Score 5     Last GAD7 score on record =   SOFIYA-7 SCORE 10/23/2017   Total Score 5     Alcohol Score = 2    HEALTH MAINTENANCE:  Cholesterol: (No results found for: CHOL patient is fasting  Last Mammo: per patient had done 3-4 years ago, Result: normal, Next Mammo: due at age 38 years  Pap: no cervical cells in pap done last year  Colonoscopy: NA  Dexa: Never    Health maintenance updated:  yes    HISTORY:  Obstetric History       T0      L0     SAB0   TAB0   Ectopic0   Multiple0   Live Births0           Patient Active Problem List   Diagnosis     Obesity (BMI 35.0-39.9 without comorbidity)     Anemia, unspecified type     Hypothyroidism due to Hashimoto's thyroiditis     Past Surgical History:   Procedure Laterality Date     intestinal malrotation surgery 2011    Exploratory Laparotomy     SALPINGECTOMY      Performed at the time of her exploratory laparotomy      Social History   Substance Use Topics     Smoking status: Never Smoker     Smokeless tobacco: Never Used     Alcohol use No      Social History     Social History Narrative    Same Sex relationship. Partner is Beatrice. Moved to [place] from [place] after inheriting a house from Beatrice's relative. Looking into adoption. Beatrice had endometrial hyperplasia after miscarriage with Chime's egg.     Denies intimate partner violence.     Problem (# of Occurrences) Relation (Name,Age of Onset)    CANCER (1) Father    Neuropathy (1) Mother            Current Outpatient Prescriptions   Medication Sig     vitamin D (ERGOCALCIFEROL) 25408 UNIT capsule Take 1 capsule (50,000 Units) by mouth every 7 days for 8 doses     No current facility-administered medications for this visit.      Allergies   Allergen Reactions     Penicillins        Past medical, surgical, social and family histories were reviewed and updated in EPIC.    ROS:   12 point review of systems negative other  "than symptoms noted below.  Constitutional: Weight Gain  Head: Sore Throat  Blood/Lymph: Lymph Node Enlargement    EXAM:  /70  Pulse 78  Ht 5' 7\" (1.702 m)  Wt 226 lb 12.8 oz (102.9 kg)  LMP 10/16/2017 (Exact Date)  BMI 35.52 kg/m2   BMI: Body mass index is 35.52 kg/(m^2).    PHYSICAL EXAM:  Constitutional:  Appearance: Well nourished, well developed, alert, in no acute distress  Neck:  Lymph Nodes:  No lymphadenopathy present    Thyroid:  Gland size normal, nontender, no nodules or masses present on palpation  Chest:  Respiratory Effort:  Breathing unlabored, clear to auscultation bilaterally  Cardiovascular:    Heart: Auscultation:  Regular rate, normal rhythm, no murmurs present  Breasts: Palpation of Breasts and Axillae:  No masses present on palpation, no breast tenderness., Axillary Lymph Nodes:  No lymphadenopathy present. and No nodularity, asymmetry or nipple discharge bilaterally. No Skin changes.   Gastrointestinal:   Abdominal Examination:  Well healed vertical scar. Abdomen nontender to palpation, tone normal without rigidity or guarding, no masses present, umbilicus without lesions.   Liver and Spleen:  No hepatomegaly present, liver nontender to palpation      Lymphatic: Lymph Nodes:  No other lymphadenopathy present  Skin:  General Inspection:  No rashes present, no lesions present, no areas of  discoloration    Genitalia and Groin:  No rashes present, no lesions present, no areas of  discoloration, no masses present  Neurologic/Psychiatric:    Mental Status:  Oriented X3     Pelvic Exam:  External Genitalia:     Normal appearance for age, no discharge present, no tenderness present, no inflammatory lesions present, color normal  Vagina:     Normal vaginal vault without central or paravaginal defects, no discharge present, no inflammatory lesions present, no masses present  Bladder:     Nontender to palpation  Urethra:   Urethral Body:  Urethra palpation normal, urethra structural support " normal   Urethral Meatus:  No erythema or lesions present  Cervix:     Appearance healthy, no lesions present, nontender to palpation, no bleeding present  Uterus:     Uterine size difficult to ascertain due to habitus  Adnexa:     Difficult to ascertain due to habitus  Perineum:     Perineum within normal limits, no evidence of trauma, no rashes or skin lesions present  Anus:     Anus within normal limits, no hemorrhoids present  Genitalia and Groin:     No rashes present, no lesions present, no areas of discoloration, no masses present      ASSESSMENT:  36 year old female with satisfactory annual exam.    ICD-10-CM    1. Encounter for gynecological examination without abnormal finding Z01.419 Pap imaged thin layer screen with HPV - recommended age 30 - 65     HPV High Risk Types DNA Cervical   2. Anemia, unspecified type D64.9 Ferritin     Iron and iron binding capacity     Vitamin B12     Folate     Hemoglobin   3. Hypothyroidism due to Hashimoto's thyroiditis E03.8 TSH with free T4 reflex    E06.3    4. Screening for lipid disorders Z13.220 Lipid Profile (Chol, Trig, HDL, LDL calc)   5. Obesity (BMI 35.0-39.9 without comorbidity) E66.9 Glucose, whole blood     CANCELED: Glucose whole blood   6. Menorrhagia with regular cycle N92.0 US Pelvic Complete w Transvaginal       PLAN:  Pap smear and breast exam performed as above  Hypothyroidism: poor tolerance of synthroid in the past. She was encouraged to keep her appointment with the Endocrinologist on 11/14. TSH sent to help treatment recommendations during that visit as she has not had one in one year.  Anemia: normal MCV but with history of heavy menses. Will send iron studies and folate and B12 levels as well. If iron is low, I recommended to Chime that this could be treated with oral iron.  Heavy Menses: regular menses. Will send for pelvic ultrasound to rule out an anatomic cause. She was encouraged to get the ultrasound prior to leaving for [place] as she just  ended her menses.   I also discussed an endometrial biopsy as Chime's obesity is an independent risk factor for endometrial hyperplasia.    Almita Cheung MD

## 2017-10-23 NOTE — LETTER
October 30, 2017    Chance Mazariegos  [address] [address]    Dear Chance,  We are happy to inform you that your PAP smear result from 10/23/17 is normal.  We are now able to do a follow up test on PAP smears. The DNA test is for HPV (Human Papilloma Virus). Cervical cancer is closely linked with certain types of HPV. Your result showed no evidence of high risk HPV.  Therefore we recommend you return in 3 years for your next pap smear.  You will still need to return to the clinic every year for an annual exam and other preventive tests.  Please contact the clinic at [phone] with any questions.  Sincerely,    Almita Cheung MD/yaima

## 2017-10-23 NOTE — LETTER
10/23/2017         RE: Chance Mazariegos  [address] [address]        Dear Colleague,    Thank you for referring your patient, Chance Mazariegos, to the [facility]TH[facility]ERY. Please see a copy of my visit note below.    HISTORY OF PRESENT ILLNESS:    Chance Mazariegos is a 36 year old female who is seen in consultation at the request of Dr. Campbell for left hip pain    Present symptoms: Pt states hip pain has been present for a couple months. Describes pain as very sharp when doing figure 4 motion.  Pt states she will have anterior groin pain when deeper into the figure 4; will have lateral hip pain when starting the motion.   She is undergoing a workup for her neck. But she denies any low back issues.  No symptom changes related to Valsalva maneuvers. She denies any  Bowel or bladder function changes. She denies radicular symptoms.  Pain is in the groin area with flexion and external rotation of the hip. Sometimes she can have pain in the lateral aspect.  Treatments tried to this point: stretching  Orthopedic PMH: no ortho surgeries; hx of Left plantar fascia rupture     Past Medical History:   Diagnosis Date     Hashimoto's thyroiditis        Past Surgical History:   Procedure Laterality Date     intestinal malrotation surgery 2011 2011    Exploratory Laparotomy     SALPINGECTOMY  2011    Performed at the time of her exploratory laparotomy       Family History   Problem Relation Age of Onset     Neuropathy Mother      Thyroid Disease Mother      CANCER Father      throat cancer     CEREBROVASCULAR DISEASE Maternal Grandmother      Thyroid Disease Maternal Grandmother        Social History     Social History     Marital status:      Spouse name: N/A     Number of children: N/A     Years of education: N/A     Occupational History     Not on file.     Social History Main Topics     Smoking status: Never Smoker     Smokeless tobacco: Never Used     Alcohol use No     Drug use: No      "Sexual activity: Yes     Partners: Female     Birth control/ protection: None     Other Topics Concern     Not on file     Social History Narrative    Same Sex relationship. Partner is Beatrice. Moved to [place] from [place] after inheriting a house from Beatrice's relative. Looking into adoption. Beatrice had endometrial hyperplasia after miscarriage with Chime's egg.     Denies intimate partner violence.       Current Outpatient Prescriptions   Medication Sig Dispense Refill     vitamin D (ERGOCALCIFEROL) 30493 UNIT capsule Take 1 capsule (50,000 Units) by mouth every 7 days for 8 doses 8 capsule 3     IBUPROFEN PO          Allergies   Allergen Reactions     Penicillins        REVIEW OF SYSTEMS:  CONSTITUTIONAL:  NEGATIVE for fever, chills, change in weight  INTEGUMENTARY/SKIN:  NEGATIVE for worrisome rashes, moles or lesions  EYES:  NEGATIVE for vision changes or irritation  ENT/MOUTH:  NEGATIVE for ear, mouth and throat problems  RESP:  NEGATIVE for significant cough or SOB  BREAST:  NEGATIVE for masses, tenderness or discharge  CV:  NEGATIVE for chest pain, palpitations or peripheral edema  GI:  NEGATIVE for nausea, abdominal pain, heartburn, or change in bowel habits  :  Negative   MUSCULOSKELETAL:  See HPI above  NEURO:  NEGATIVE for weakness, dizziness or paresthesias  ENDOCRINE:  NEGATIVE for temperature intolerance, skin/hair changes  HEME/ALLERGY/IMMUNE:  NEGATIVE for bleeding problems  PSYCHIATRIC:  NEGATIVE for changes in mood or affect      PHYSICAL EXAM:  /70  Ht 5' 7\" (1.702 m)  Wt 226 lb (102.5 kg)  LMP 10/16/2017 (Exact Date)  BMI 35.4 kg/m2  Body mass index is 35.4 kg/(m^2).   GENERAL APPEARANCE: healthy, alert and no distress   HEENT: No apparent thyroid megaly. Clear sclera with normal ocular movement  RESPIRATORY: No labored breathing  SKIN: no suspicious lesions or rashes  NEURO: Normal strength and tone, mentation intact and speech normal  VASCULAR: Good pulses, and capillary " refill   LYMPH: no lymphadenopathy   PSYCH:  mentation appears normal and affect normal/bright    MUSCULOSKELETAL:  She is able to get up from sitting readily  No limping noted  Full range of motion of the hips  Straight leg raising is negative  Greater trochanter region is not particular tender  Pain with about 70 of flexion and external rotation  However, the flexion and internal rotation does not cause pain.  Similarly, hyperextension and external rotation does not cause any pain.  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 Findings a thoroughly explained.  Based on her symptoms and lack of significant pathology on x-rays, most likely she has mild impingement phenomenon and/or labral pathology.  As a starting point, is that of immediate surgical intervention,physical therapy for hip musculature stretching was felt to be very reasonable.  If she has continuing pain beyond three months of doing stretching exercises, we will consider further evaluation of the situation with MRI scan arthrogram. She can either come back or call us to make that arrangement.  Physical therapy referral was made today.    Imaging Interpretation:   x-rays of the pelvis and lateral left hip. No significant pathologies are noted other than minimal prominence of the lateral femoral head and suggestion of less than ideal  Anteversion of the acetabulum on the lateral view. No acute fractures or other osseous pathology noted. Hip joint spaces are well maintained.      Dandre Cardenas MD  Department of Orthopedic Surgery        Disclaimer: This note consists of symbols derived from keyboarding, dictation and/or voice recognition software. As a result, there may be errors in the  script that have gone undetected. Please consider this when interpreting information found in this chart.      Again, thank you for allowing me to participate in the care of your patient.        Sincerely,        Dandre Cardenas MD

## 2017-10-23 NOTE — MR AVS SNAPSHOT
After Visit Summary   10/23/2017    Chance Mazariegos    MRN: [id number]           Patient Information     Date Of Birth          1980        Visit Information        Provider Department      10/23/2017 2:00 PM Dandre Cardenas MD [facility]        Today's Diagnoses     Left hip impingement syndrome    -  1    Left hip pain           Follow-ups after your visit        Additional Services     [facility] PT, HAND, AND CHIROPRACTIC REFERRAL       === This order will print in the ABDI Scheduling  Office ===    Your provider has referred you to: [facility], [phone]    Indication/Reason for Referral: hip musculature stretching  Onset of Illness:   Therapy Orders: As indicated per protocol or clinical pathway.  Special Programs None   Special Request:Eval & Treat  Modalities: As indicated  Equipment: As indicated    Additional Comments:       Please be aware that coverage of these services is subject to the terms and limitations of your health insurance plan.  Call member services at your health plan with any benefit or coverage questions.      Please bring the following to your appointment:    >>   Any x-rays, CTs or MRIs which have been performed.  Contact the facility where they were done to arrange for  prior to your scheduled appointment.  Any new CT, MRI or other procedures ordered by your specialist must be performed at a [facility] facility or coordinated by your clinic's referral office.    >>   List of current medications   >>   This referral request   >>   Any documents/labs given to you for this referral                  Your next 10 appointments already scheduled     Oct 24, 2017  8:00 AM CDT   MR CERVICAL SPINE W/O CONTRAST with SHMRP2   [facility] [place] [facility] ([facility])    [address] [place] [address] [phone]           Take your medicines as usual, unless your doctor tells you not to. Bring a list of  your current medicines to your exam (including vitamins, minerals and over-the-counter drugs). Also bring the results of similar scans you may have had.  Please remove any body piercings and hair extensions before you arrive.  Follow your doctor s orders. If you do not, we may have to postpone your exam.  You will not have contrast for this exam. You do not need to do anything special to prepare.  The MRI machine uses a strong magnet. Please wear clothes without metal (snaps, zippers). A sweatsuit works well, or we may give you a hospital gown.   **IMPORTANT** THE INSTRUCTIONS BELOW ARE ONLY FOR THOSE PATIENTS WHO HAVE BEEN TOLD THEY WILL RECEIVE SEDATION OR GENERAL ANESTHESIA DURING THEIR MRI PROCEDURE:  IF YOU WILL RECEIVE SEDATION (take medicine to help you relax during your exam):   You must get the medicine from your doctor before you arrive. Bring the medicine to the exam. Do not take it at home.   Arrive one hour early. Bring someone who can take you home after the test. Your medicine will make you sleepy. After the exam, you may not drive, take a bus or take a taxi by yourself.   No eating 8 hours before your exam. You may have clear liquids up until 4 hours before your exam. (Clear liquids include water, clear tea, black coffee and fruit juice without pulp.)  IF YOU WILL RECEIVE ANESTHESIA (be asleep for your exam):   Arrive 1 1/2 hours early. Bring someone who can take you home after the test. You may not drive, take a bus or take a taxi by yourself.   No eating 8 hours before your exam. You may have clear liquids up until 4 hours before your exam. (Clear liquids include water, clear tea, black coffee and fruit juice without pulp.)   You will spend four to five hours in the recovery room.  Please call the Imaging Department at your exam site with any questions.            Oct 31, 2017 10:30 AM CDT   US PELVIC COMPLETE W TRANSVAGINAL with WEUS1   [facility] ([facility])    [address] [address]   [phone]           Please bring a list of your medicines (including vitamins, minerals and over-the-counter drugs). Also, tell your doctor about any allergies you may have. Wear comfortable clothes and leave your valuables at home.  Adults: Drink six 8-ounce glasses of fluid one hour before your exam. Do NOT empty your bladder.  If you need to empty your bladder before your exam, try to release only a little bit of urine. Then, drink another 8oz glass of fluid.  Children: Children who are potty trained should drink at least 4 cups (32 oz) of liquid 45 minutes to one hour prior to the exam. The child s bladder must be full in order to achieve a diagnostic exam. If your child is very uncomfortable or has an urgent need to pee, please notify a technologist; they will try to find out how much longer the wait may be and provide instructions to help relieve the pressure. Occasionally it is medically necessary to insert a urinary catheter to fill the bladder.  Please call the Imaging Department at your exam site with any questions.            Oct 31, 2017 11:00 AM CDT   Office Visit with Almita Cheung MD   [facility] ([facility])    [address] [place] [id number] [phone]           Bring a current list of meds and any records pertaining to this visit. For Physicals, please bring immunization records and any forms needing to be filled out. Please arrive 10 minutes early to complete paperwork.            Nov 14, 2017 12:00 PM CST   (Arrive by 11:45 AM)   NEW ENDOCRINE with Alta Glez MD   [facility] ([facility])    [address]  3rd Floor  [facility] [address] [phone]              Future tests that were ordered for you today     Open Future Orders        Priority Expected Expires Ordered    US Pelvic  "Complete w Transvaginal Routine  10/23/2018 10/23/2017            Who to contact     If you have questions or need follow up information about today's clinic visit or your schedule please contact [facility] ORTHOP[facility] SURGERY directly at [phone].  Normal or non-critical lab and imaging results will be communicated to you by MyChart, letter or phone within 4 business days after the clinic has received the results. If you do not hear from us within 7 days, please contact the clinic through [employer] or phone. If you have a critical or abnormal lab result, we will notify you by phone as soon as possible.  Submit refill requests through [employer] or call your pharmacy and they will forward the refill request to us. Please allow 3 business days for your refill to be completed.          Additional Information About Your Visit        [email or web address] Information     [employer] lets you send messages to your doctor, view your test results, renew your prescriptions, schedule appointments and more. To sign up, go to [email or web address] . Click on \"Log in\" on the left side of the screen, which will take you to the Welcome page. Then click on \"Sign up Now\" on the right side of the page.     You will be asked to enter the access code listed below, as well as some personal information. Please follow the directions to create your username and password.     Your access code is: [email or web address]  Expires: 2017  2:26 AM     Your access code will  in 90 days. If you need help or a new code, please call your [facility] clinic or [phone].        Care EveryWhere ID     This is your Care EveryWhere ID. This could be used by other organizations to access your [facility] medical records  [id number]        Your Vitals Were     Height Last Period BMI (Body Mass Index)             5' 7\" (1.702 m) 10/16/2017 (Exact Date) 35.4 kg/m2          Blood Pressure from Last 3 Encounters:   10/23/17 104/70   10/23/17 104/70   10/18/17 101/64    " Weight from Last 3 Encounters:   10/23/17 226 lb (102.5 kg)   10/23/17 226 lb 12.8 oz (102.9 kg)   10/18/17 232 lb (105.2 kg)              We Performed the Following     [facility] PT, HAND, AND CHIROPRACTIC REFERRAL        Primary Care Provider Office Phone # Fax #    Edyta Paulo Campbell [phone]       [address] [address]        Equal Access to Services     LUCY GLASER : Hadii aad ku hadasho Soomaali, waaxda luqadaha, qaybta kaalmada adeegyada, waxay idiin hayaan adehope veronica . So [facility] [phone].    ATENCIÓN: Si habla español, tiene a dickerson disposición servicios gratuitos de asistencia lingüística. [facility] [phone].    We comply with applicable federal civil rights laws and [place] laws. We do not discriminate on the basis of race, color, national origin, age, disability, sex, sexual orientation, or gender identity.            Thank you!     Thank you for choosing [facility]THOP[facility] SURG[facility]  for your care. Our goal is always to provide you with excellent care. Hearing back from our patients is one way we can continue to improve our services. Please take a few minutes to complete the written survey that you may receive in the mail after your visit with us. Thank you!             Your Updated Medication List - Protect others around you: Learn how to safely use, store and throw away your medicines at www.disposemymeds.org.          This list is accurate as of: 10/23/17  2:56 PM.  Always use your most recent med list.                   Brand Name Dispense Instructions for use Diagnosis    IBUPROFEN PO       Left hip pain, Left hip impingement syndrome       vitamin D 66066 UNIT capsule    ERGOCALCIFEROL    8 capsule    Take 1 capsule (50,000 Units) by mouth every 7 days for 8 doses    Vitamin D deficiency

## 2017-10-24 DIAGNOSIS — E61.1 LOW IRON: Primary | ICD-10-CM

## 2017-10-24 LAB
CHOLEST SERPL-MCNC: 169 MG/DL
FERRITIN SERPL-MCNC: 11 NG/ML (ref 12–150)
HDLC SERPL-MCNC: 41 MG/DL
IRON SATN MFR SERPL: 17 % (ref 15–46)
IRON SERPL-MCNC: 73 UG/DL (ref 35–180)
LDLC SERPL CALC-MCNC: 107 MG/DL
NONHDLC SERPL-MCNC: 128 MG/DL
T4 FREE SERPL-MCNC: 0.88 NG/DL (ref 0.76–1.46)
TIBC SERPL-MCNC: 421 UG/DL (ref 240–430)
TRIGL SERPL-MCNC: 107 MG/DL
TSH SERPL DL<=0.005 MIU/L-ACNC: 4.23 MU/L (ref 0.4–4)

## 2017-10-24 RX ORDER — FERROUS SULFATE 325(65) MG
325 TABLET ORAL
Qty: 90 TABLET | Refills: 3 | Status: SHIPPED | OUTPATIENT
Start: 2017-10-24 | End: 2018-01-17

## 2017-10-24 ASSESSMENT — ANXIETY QUESTIONNAIRES: GAD7 TOTAL SCORE: 5

## 2017-10-25 LAB
COPATH REPORT: NORMAL
PAP: NORMAL

## 2017-10-26 LAB
FINAL DIAGNOSIS: NORMAL
HPV HR 12 DNA CVX QL NAA+PROBE: NEGATIVE
HPV16 DNA SPEC QL NAA+PROBE: NEGATIVE
HPV18 DNA SPEC QL NAA+PROBE: NEGATIVE
SPECIMEN DESCRIPTION: NORMAL

## 2017-10-31 ENCOUNTER — OFFICE VISIT (OUTPATIENT)
Dept: OBGYN | Facility: CLINIC | Age: 37
End: 2017-10-31
Payer: COMMERCIAL

## 2017-10-31 ENCOUNTER — RADIANT APPOINTMENT (OUTPATIENT)
Dept: ULTRASOUND IMAGING | Facility: CLINIC | Age: 37
End: 2017-10-31
Payer: COMMERCIAL

## 2017-10-31 VITALS
SYSTOLIC BLOOD PRESSURE: 110 MMHG | HEIGHT: 67 IN | BODY MASS INDEX: 35.94 KG/M2 | DIASTOLIC BLOOD PRESSURE: 68 MMHG | WEIGHT: 229 LBS | HEART RATE: 80 BPM

## 2017-10-31 DIAGNOSIS — N92.0 MENORRHAGIA WITH REGULAR CYCLE: ICD-10-CM

## 2017-10-31 DIAGNOSIS — N92.0 MENORRHAGIA WITH REGULAR CYCLE: Primary | ICD-10-CM

## 2017-10-31 PROCEDURE — 76830 TRANSVAGINAL US NON-OB: CPT | Performed by: OBSTETRICS & GYNECOLOGY

## 2017-10-31 PROCEDURE — 99213 OFFICE O/P EST LOW 20 MIN: CPT | Performed by: OBSTETRICS & GYNECOLOGY

## 2017-10-31 NOTE — PROGRESS NOTES
SUBJECTIVE:                                                   Chance Mazariegos is a 36 year old female who presents to clinic today for the following health issue(s):  Patient presents with:  Ultrasound: follow up on heavy periods        HPI:  Ultrasound today showed irregular endometrium with possibility of endometrial polyp. Chance states that she was not mentally prepared for an endometrial biopsy today. She has some fear and anxiety about all of this because her partner Beatrice went through a similar presentation before she was diagnosed with endometrial hyperplasia which was endometrial cancer on final pathology. She is planning to go out of town for work tomorrow and would like to defer any further exams until after she returns.    Patient's last menstrual period was 10/16/2017 (exact date)..   Patient is sexually active, .  Using none for contraception.    reports that she has never smoked. She has never used smokeless tobacco.      STD testing offered?  Declined    Health maintenance updated:  yes    Today's PHQ-2 Score:   PHQ-2 (  [employer]) 10/31/2017   Q1: Little interest or pleasure in doing things 0   Q2: Feeling down, depressed or hopeless 0   PHQ-2 Score 0     Today's PHQ-9 Score:   PHQ-9 SCORE 10/23/2017   Total Score 5     Today's SOFIYA-7 Score:   SOFIYA-7 SCORE 10/23/2017   Total Score 5       Problem list and histories reviewed & adjusted, as indicated.  Additional history: as documented.    Patient Active Problem List   Diagnosis     Obesity (BMI 35.0-39.9 without comorbidity)     Anemia, unspecified type     Hypothyroidism due to Hashimoto's thyroiditis     Past Surgical History:   Procedure Laterality Date     intestinal malrotation surgery 2011    Exploratory Laparotomy     SALPINGECTOMY      Performed at the time of her exploratory laparotomy      Social History   Substance Use Topics     Smoking status: Never Smoker     Smokeless tobacco: Never Used     Alcohol use No       "Problem (# of Occurrences) Relation (Name,Age of Onset)    CANCER (1) Father: throat cancer    CEREBROVASCULAR DISEASE (1) Maternal Grandmother    Neuropathy (1) Mother    Thyroid Disease (2) Mother, Maternal Grandmother            Current Outpatient Prescriptions   Medication Sig     ferrous sulfate (IRON) 325 (65 FE) MG tablet Take 1 tablet (325 mg) by mouth daily (with breakfast)     IBUPROFEN PO      vitamin D (ERGOCALCIFEROL) 81707 UNIT capsule Take 1 capsule (50,000 Units) by mouth every 7 days for 8 doses     No current facility-administered medications for this visit.      Allergies   Allergen Reactions     Penicillins        ROS:  12 point review of systems negative other than symptoms noted below.    OBJECTIVE:     /68  Pulse 80  Ht 5' 7\" (1.702 m)  Wt 229 lb (103.9 kg)  LMP 10/16/2017 (Exact Date)  BMI 35.87 kg/m2  Body mass index is 35.87 kg/(m^2).    Exam:  Constitutional:  Appearance: Well nourished, well developed alert, in no acute distress  Neurologic/Psychiatric:  Mental Status:  Oriented X3      In-Clinic Test Results:  No results found for this or any previous visit (from the past 24 hour(s)).    ASSESSMENT/PLAN:                                                        ICD-10-CM    1. Menorrhagia with regular cycle N92.0    Ultrasound imaging was reviewed with Chance today. She was counseled about my recommendation for an endometrial biopsy. She is not ready to have this done and will return for this later with her partner. We also discussed hysteroscopy and polypectomy with dilation and curettage for management of an endometrial polyp.    Patient Instructions       Endometrial Biopsy    Endometrial biopsy is a procedure used to study the lining of the uterus. The uterus is also called the endometrium. The biopsy is usually done in your healthcare provider s office. During the biopsy, small tissue samples are taken from inside the uterus. These are then sent to a lab for study. If any " problems are found, you and your healthcare provider will discuss treatment options. The biopsy usually takes only a few minutes, and you can often go back to your normal routine as soon as the procedure is over.  Reasons for the procedure  Endometrial biopsy may help pinpoint the cause of certain problems. These include:    Abnormal Pap test results    Bleeding after menopause    Bleeding associated with hormone therapy    Having certain types of cancer    Heavy or irregular menstrual periods    Prolonged bleeding    Trouble getting pregnant (fertility problems)    Damage to the uterine wall (very rare)  What are the risks?  Problems with endometrial biopsy are rare, but can include:    Bleeding    Damage to the uterine wall (very rare)    Infection  Getting ready for the procedure  Your healthcare provider will ask about your health and any medicines you take, like blood thinners. Before your biopsy, you may have tests to make sure you re not pregnant or have an infection. You may also be asked to sign a consent form. You should do the following 1 to 2 days before the biopsy:    Avoid using creams or other vaginal medicines.    Avoid douching.    Ask your healthcare provider if you should take pain medicines shortly before the test.  During the biopsy  During the biopsy, you will likely experience the following:    You will be asked to lie on an exam table with your knees bent, just as you do for a Pap test.    You may have a brief pelvic exam. An instrument called a speculum is then inserted into the vagina to hold it open.    An antiseptic solution may be applied to the cervix. The cervix may also be numbed with an anesthetic or dilated to widen the opening.    A small tube is passed through the cervix into the uterus.    It is normal to feel some cramping when the tube is inserted. But tell your healthcare provider if you have severe cramping or are very uncomfortable.    Using mild suction, samples are taken  from the uterine lining. You may feel pinching or additional cramping when this is done.    The tube and speculum are then removed and the samples are sent to a lab for study.  After the procedure  After the procedure, you may experience the following:    If you feel lightheaded or dizzy, you can rest on the table until you re ready to get dressed.    For a few hours, you may feel some mild cramping. This can usually be relieved with over-the-counter pain medicines.    You may have some bleeding for a few days. Use pads instead of tampons.    Don t douche or use any vaginal medicines unless your healthcare provider says it s OK.    Ask your healthcare provider when it s OK to have sex again.  Follow-up care  It will take about a week for the biopsy results to come back from the lab. Then you and your healthcare provider can discuss the results. These may show that no treatment is required. Or you may be scheduled for a follow-up appointment and more tests. If your biopsy was done for fertility problems, be sure to record the day when your next period begins.  Call your healthcare provider   Call your healthcare provider if you have any of the following:    Fever over 100.4 F (38.0 C)    Foul-smelling or unusual vaginal discharge    Heavy bleeding (soaking more than 1 pad an hour for 2 hours)    Severe cramping or increasing pain   Date Last Reviewed: 6/1/2017 2000-2017 [employer]. [address] [place] [address]. All rights reserved. This information is not intended as a substitute for professional medical care. Always follow your healthcare professional's instructions.                Almita Cheung MD  [facility]

## 2017-10-31 NOTE — MR AVS SNAPSHOT
After Visit Summary   10/31/2017    Chance Mazariegos    MRN: [id number]           Patient Information     Date Of Birth          1980        Visit Information        Provider Department      10/31/2017 11:00 AM Almita Cheung MD [facility] Instructions      Endometrial Biopsy    Endometrial biopsy is a procedure used to study the lining of the uterus. The uterus is also called the endometrium. The biopsy is usually done in your healthcare provider s office. During the biopsy, small tissue samples are taken from inside the uterus. These are then sent to a lab for study. If any problems are found, you and your healthcare provider will discuss treatment options. The biopsy usually takes only a few minutes, and you can often go back to your normal routine as soon as the procedure is over.  Reasons for the procedure  Endometrial biopsy may help pinpoint the cause of certain problems. These include:    Abnormal Pap test results    Bleeding after menopause    Bleeding associated with hormone therapy    Having certain types of cancer    Heavy or irregular menstrual periods    Prolonged bleeding    Trouble getting pregnant (fertility problems)    Damage to the uterine wall (very rare)  What are the risks?  Problems with endometrial biopsy are rare, but can include:    Bleeding    Damage to the uterine wall (very rare)    Infection  Getting ready for the procedure  Your healthcare provider will ask about your health and any medicines you take, like blood thinners. Before your biopsy, you may have tests to make sure you re not pregnant or have an infection. You may also be asked to sign a consent form. You should do the following 1 to 2 days before the biopsy:    Avoid using creams or other vaginal medicines.    Avoid douching.    Ask your healthcare provider if you should take pain medicines shortly before the test.  During the biopsy  During the biopsy, you  will likely experience the following:    You will be asked to lie on an exam table with your knees bent, just as you do for a Pap test.    You may have a brief pelvic exam. An instrument called a speculum is then inserted into the vagina to hold it open.    An antiseptic solution may be applied to the cervix. The cervix may also be numbed with an anesthetic or dilated to widen the opening.    A small tube is passed through the cervix into the uterus.    It is normal to feel some cramping when the tube is inserted. But tell your healthcare provider if you have severe cramping or are very uncomfortable.    Using mild suction, samples are taken from the uterine lining. You may feel pinching or additional cramping when this is done.    The tube and speculum are then removed and the samples are sent to a lab for study.  After the procedure  After the procedure, you may experience the following:    If you feel lightheaded or dizzy, you can rest on the table until you re ready to get dressed.    For a few hours, you may feel some mild cramping. This can usually be relieved with over-the-counter pain medicines.    You may have some bleeding for a few days. Use pads instead of tampons.    Don t douche or use any vaginal medicines unless your healthcare provider says it s OK.    Ask your healthcare provider when it s OK to have sex again.  Follow-up care  It will take about a week for the biopsy results to come back from the lab. Then you and your healthcare provider can discuss the results. These may show that no treatment is required. Or you may be scheduled for a follow-up appointment and more tests. If your biopsy was done for fertility problems, be sure to record the day when your next period begins.  Call your healthcare provider   Call your healthcare provider if you have any of the following:    Fever over 100.4 F (38.0 C)    Foul-smelling or unusual vaginal discharge    Heavy bleeding (soaking more than 1 pad an hour  for 2 hours)    Severe cramping or increasing pain   Date Last Reviewed: 6/1/2017 2000-2017 [employer]. [address] [place] [address], [place] [address]. All rights reserved. This information is not intended as a substitute for professional medical care. Always follow your healthcare professional's instructions.                Follow-ups after your visit        Your next 10 appointments already scheduled     Nov 13, 2017 10:20 AM CST   Office Visit with Almita Cheung MD   [facility]    [address] [place] [id number] [phone]           Bring a current list of meds and any records pertaining to this visit. For Physicals, please bring immunization records and any forms needing to be filled out. Please arrive 10 minutes early to complete paperwork.            Nov 14, 2017 12:00 PM CST   (Arrive by 11:45 AM)   NEW ENDOCRINE with Alta Glez MD   [facility] End[facility] ([facility])    [address] [place] [address] [phone]              Who to contact     If you have questions or need follow up information about today's clinic visit or your schedule please contact [facility] directly at [phone].  Normal or non-critical lab and imaging results will be communicated to you by Diomicshart, letter or phone within 4 business days after the clinic has received the results. If you do not hear from us within 7 days, please contact the clinic through Diomicshart or phone. If you have a critical or abnormal lab result, we will notify you by phone as soon as possible.  Submit refill requests through [employer]RU) or call your pharmacy and they will forward the refill request to us. Please allow 3 business days for your refill to be completed.          Additional Information About Your Visit        [employer] Information     [employer] lets you send messages  "to your doctor, view your test results, renew your prescriptions, schedule appointments and more. To sign up, go to [email or web address] . Click on \"Log in\" on the left side of the screen, which will take you to the Welcome page. Then click on \"Sign up Now\" on the right side of the page.     You will be asked to enter the access code listed below, as well as some personal information. Please follow the directions to create your username and password.     Your access code is: [email or web address]  Expires: 2017  2:26 AM     Your access code will  in 90 days. If you need help or a new code, please call your [place] clinic or [phone].        Care EveryWhere ID     This is your Care EveryWhere ID. This could be used by other organizations to access your [place] medical records  [id number]        Your Vitals Were     Pulse Height Last Period BMI (Body Mass Index)          80 5' 7\" (1.702 m) 10/16/2017 (Exact Date) 35.87 kg/m2         Blood Pressure from Last 3 Encounters:   10/31/17 110/68   10/23/17 104/70   10/23/17 104/70    Weight from Last 3 Encounters:   10/31/17 229 lb (103.9 kg)   10/23/17 226 lb (102.5 kg)   10/23/17 226 lb 12.8 oz (102.9 kg)              Today, you had the following     No orders found for display       Primary Care Provider Office Phone # Fax #    Edyta Paulo Campbell [phone]       [facility] [address] [place] [address] 150  [facility] [address]        Equal Access to Services     [facility] : Hadii leatha [place], kwabena patel, qavira nguyen, hortencia veronica . So [facility] [phone].    ATENCIÓN: Si habla español, tiene a dickerson disposición servicios gratuitos de asistencia lingüística. Llame al [phone].    We comply with applicable federal civil rights laws and [place] laws. We do not discriminate on the basis of race, color, national origin, age, disability, sex, sexual orientation, or gender identity.          "   Thank you!     Thank you for choosing [facility]  for your care. Our goal is always to provide you with excellent care. Hearing back from our patients is one way we can continue to improve our services. Please take a few minutes to complete the written survey that you may receive in the mail after your visit with us. Thank you!             Your Updated Medication List - Protect others around you: Learn how to safely use, store and throw away your medicines at www.disposemymeds.org.          This list is accurate as of: 10/31/17 11:17 AM.  Always use your most recent med list.                   Brand Name Dispense Instructions for use Diagnosis    ferrous sulfate 325 (65 FE) MG tablet    IRON    90 tablet    Take 1 tablet (325 mg) by mouth daily (with breakfast)    Low iron       IBUPROFEN PO       Left hip pain, Left hip impingement syndrome       vitamin D 94873 UNIT capsule    ERGOCALCIFEROL    8 capsule    Take 1 capsule (50,000 Units) by mouth every 7 days for 8 doses    Vitamin D deficiency

## 2017-10-31 NOTE — PATIENT INSTRUCTIONS
Endometrial Biopsy    Endometrial biopsy is a procedure used to study the lining of the uterus. The uterus is also called the endometrium. The biopsy is usually done in your healthcare provider s office. During the biopsy, small tissue samples are taken from inside the uterus. These are then sent to a lab for study. If any problems are found, you and your healthcare provider will discuss treatment options. The biopsy usually takes only a few minutes, and you can often go back to your normal routine as soon as the procedure is over.  Reasons for the procedure  Endometrial biopsy may help pinpoint the cause of certain problems. These include:    Abnormal Pap test results    Bleeding after menopause    Bleeding associated with hormone therapy    Having certain types of cancer    Heavy or irregular menstrual periods    Prolonged bleeding    Trouble getting pregnant (fertility problems)    Damage to the uterine wall (very rare)  What are the risks?  Problems with endometrial biopsy are rare, but can include:    Bleeding    Damage to the uterine wall (very rare)    Infection  Getting ready for the procedure  Your healthcare provider will ask about your health and any medicines you take, like blood thinners. Before your biopsy, you may have tests to make sure you re not pregnant or have an infection. You may also be asked to sign a consent form. You should do the following 1 to 2 days before the biopsy:    Avoid using creams or other vaginal medicines.    Avoid douching.    Ask your healthcare provider if you should take pain medicines shortly before the test.  During the biopsy  During the biopsy, you will likely experience the following:    You will be asked to lie on an exam table with your knees bent, just as you do for a Pap test.    You may have a brief pelvic exam. An instrument called a speculum is then inserted into the vagina to hold it open.    An antiseptic solution may be applied to the cervix. The cervix  may also be numbed with an anesthetic or dilated to widen the opening.    A small tube is passed through the cervix into the uterus.    It is normal to feel some cramping when the tube is inserted. But tell your healthcare provider if you have severe cramping or are very uncomfortable.    Using mild suction, samples are taken from the uterine lining. You may feel pinching or additional cramping when this is done.    The tube and speculum are then removed and the samples are sent to a lab for study.  After the procedure  After the procedure, you may experience the following:    If you feel lightheaded or dizzy, you can rest on the table until you re ready to get dressed.    For a few hours, you may feel some mild cramping. This can usually be relieved with over-the-counter pain medicines.    You may have some bleeding for a few days. Use pads instead of tampons.    Don t douche or use any vaginal medicines unless your healthcare provider says it s OK.    Ask your healthcare provider when it s OK to have sex again.  Follow-up care  It will take about a week for the biopsy results to come back from the lab. Then you and your healthcare provider can discuss the results. These may show that no treatment is required. Or you may be scheduled for a follow-up appointment and more tests. If your biopsy was done for fertility problems, be sure to record the day when your next period begins.  Call your healthcare provider   Call your healthcare provider if you have any of the following:    Fever over 100.4 F (38.0 C)    Foul-smelling or unusual vaginal discharge    Heavy bleeding (soaking more than 1 pad an hour for 2 hours)    Severe cramping or increasing pain   Date Last Reviewed: 6/1/2017 2000-2017 [employer]. [address] [place] [address], [place] [address]. All rights reserved. This information is not intended as a substitute for professional medical care. Always follow your healthcare professional's  instructions.

## 2017-11-13 ENCOUNTER — OFFICE VISIT (OUTPATIENT)
Dept: OBGYN | Facility: CLINIC | Age: 37
End: 2017-11-13
Payer: COMMERCIAL

## 2017-11-13 VITALS
SYSTOLIC BLOOD PRESSURE: 128 MMHG | DIASTOLIC BLOOD PRESSURE: 86 MMHG | HEIGHT: 67 IN | WEIGHT: 233 LBS | BODY MASS INDEX: 36.57 KG/M2

## 2017-11-13 DIAGNOSIS — N93.9 ABNORMAL UTERINE BLEEDING (AUB): Primary | ICD-10-CM

## 2017-11-13 PROCEDURE — 58100 BIOPSY OF UTERUS LINING: CPT | Performed by: OBSTETRICS & GYNECOLOGY

## 2017-11-13 PROCEDURE — 88305 TISSUE EXAM BY PATHOLOGIST: CPT | Performed by: OBSTETRICS & GYNECOLOGY

## 2017-11-13 NOTE — MR AVS SNAPSHOT
"              After Visit Summary   11/13/2017    Chance Mazariegos    MRN: [id number]           Patient Information     Date Of Birth          1980        Visit Information        Provider Department      11/13/2017 10:20 AM Almita Cheung MD [facility]        Today's Diagnoses     Abnormal uterine bleeding (AUB)    -  1       Follow-ups after your visit        Your next 10 appointments already scheduled     Nov 14, 2017 12:00 PM CST   (Arrive by 11:45 AM)   NEW ENDOCRINE with Alta Glez MD   [facility] Endocrinology ([facility] [place])    [address] [place] [address] [phone]              Who to contact     If you have questions or need follow up information about today's clinic visit or your schedule please contact [facility] directly at [phone].  Normal or non-critical lab and imaging results will be communicated to you by MyChart, letter or phone within 4 business days after the clinic has received the results. If you do not hear from us within 7 days, please contact the clinic through MyChart or phone. If you have a critical or abnormal lab result, we will notify you by phone as soon as possible.  Submit refill requests through [employer] or call your pharmacy and they will forward the refill request to us. Please allow 3 business days for your refill to be completed.          Additional Information About Your Visit        MyChart Information     [employer] lets you send messages to your doctor, view your test results, renew your prescriptions, schedule appointments and more. To sign up, go to [email or web address] . Click on \"Log in\" on the left side of the screen, which will take you to the Welcome page. Then click on \"Sign up Now\" on the right side of the page.     You will be asked to enter the access code listed below, as well as some personal information. Please follow the directions to " "create your username and password.     Your access code is: [email or web address]  Expires: 2017  1:26 AM     Your access code will  in 90 days. If you need help or a new code, please call your [place] clinic or [phone].        Care EveryWhere ID     This is your Care EveryWhere ID. This could be used by other organizations to access your [place] medical records  [id number]        Your Vitals Were     Height Last Period Breastfeeding? BMI (Body Mass Index)          5' 7\" (1.702 m) 10/16/2017 (Exact Date) No 36.49 kg/m2         Blood Pressure from Last 3 Encounters:   17 128/86   10/31/17 110/68   10/23/17 104/70    Weight from Last 3 Encounters:   17 233 lb (105.7 kg)   10/31/17 229 lb (103.9 kg)   10/23/17 226 lb (102.5 kg)              We Performed the Following     ENDOMETRIAL BIOPSY W/O CERVICAL DILATION     Surgical pathology exam        Primary Care Provider Office Phone # Fax #    Edyta Paulo Campbell [phone] [address] [facility] [address]  [place] [address]        Equal Access to Services     CRISTAL GLASER : Hadii leatha ku hadasho Soluisali, waaxda luqadaha, qaybta kaalmada adeegyada, hortencai veronica . So [facility] [phone].    ATENCIÓN: Si habla español, tiene a dickerson disposición servicios gratuitos de asistencia lingüística. Llame al [phone].    We comply with applicable federal civil rights laws and [place] laws. We do not discriminate on the basis of race, color, national origin, age, disability, sex, sexual orientation, or gender identity.            Thank you!     Thank you for choosing [facility] [place]  for your care. Our goal is always to provide you with excellent care. Hearing back from our patients is one way we can continue to improve our services. Please take a few minutes to complete the written survey that you may receive in the mail after your visit with us. Thank you!             Your Updated Medication List - " Protect others around you: Learn how to safely use, store and throw away your medicines at www.disposemymeds.org.          This list is accurate as of: 11/13/17 12:49 PM.  Always use your most recent med list.                   Brand Name Dispense Instructions for use Diagnosis    ferrous sulfate 325 (65 FE) MG tablet    IRON    90 tablet    Take 1 tablet (325 mg) by mouth daily (with breakfast)    Low iron       IBUPROFEN PO       Left hip pain, Left hip impingement syndrome       vitamin D 20068 UNIT capsule    ERGOCALCIFEROL    8 capsule    Take 1 capsule (50,000 Units) by mouth every 7 days for 8 doses    Vitamin D deficiency

## 2017-11-13 NOTE — PROGRESS NOTES
INDICATIONS:                                                    Is a pregnancy test required: No.  Was a consent obtained?  Yes    Having endometrial biopsy for dysfunctional uterine bleeding    Today's PHQ-2 Score:   PHQ-2 ( 1999 Pfizer) 10/31/2017   Q1: Little interest or pleasure in doing things 0   Q2: Feeling down, depressed or hopeless 0   PHQ-2 Score 0       PROCEDURE;                                                      A speculum was placed in the vagina and cervix prepped with betadine. A tenaculum was attached to the cervix. A small plastic 5 mm Pipelle syringe curette was inserted into the cervical canal. The uterus was sounded to 7 cm's. A vigorous four quadrant biopsy was performed, removing a moderate amount of tissue. Two passes with two Pipelles were performed. The speculum was removed. This tissue was placed in Formalin and sent to pathology.    The patient tolerated the procedure  fairly well and she reported there was  cramping.      POST PROCEDURE;                                                      There  was  cramping at the time of discharge. She  tolerated the procedure well with moderate discomfort that improved by the time of discharge. There were no complications. Patient was discharged in stable condition.    Patient advised to call the clinic if severe pelvic pain, fever or heavy bleeding. She was given 600mg of ibuprofen in the office prior to discharge.    Almita Cheung MD  [facility].

## 2017-11-14 ENCOUNTER — OFFICE VISIT (OUTPATIENT)
Dept: ENDOCRINOLOGY | Facility: CLINIC | Age: 37
End: 2017-11-14

## 2017-11-14 VITALS
HEIGHT: 67 IN | SYSTOLIC BLOOD PRESSURE: 114 MMHG | BODY MASS INDEX: 37.13 KG/M2 | DIASTOLIC BLOOD PRESSURE: 75 MMHG | WEIGHT: 236.6 LBS | HEART RATE: 85 BPM

## 2017-11-14 DIAGNOSIS — E24.8 OTHER CUSHING'S SYNDROME (H): ICD-10-CM

## 2017-11-14 DIAGNOSIS — E66.9 OBESITY (BMI 35.0-39.9 WITHOUT COMORBIDITY): Primary | ICD-10-CM

## 2017-11-14 DIAGNOSIS — K90.9 INTESTINAL MALABSORPTION, UNSPECIFIED TYPE: ICD-10-CM

## 2017-11-14 DIAGNOSIS — E06.3 HYPOTHYROIDISM DUE TO HASHIMOTO'S THYROIDITIS: ICD-10-CM

## 2017-11-14 DIAGNOSIS — R63.5 ABNORMAL WEIGHT GAIN: ICD-10-CM

## 2017-11-14 LAB
ALBUMIN SERPL-MCNC: 3.9 G/DL (ref 3.4–5)
ALP SERPL-CCNC: 59 U/L (ref 40–150)
ALT SERPL W P-5'-P-CCNC: 27 U/L (ref 0–50)
ANION GAP SERPL CALCULATED.3IONS-SCNC: 8 MMOL/L (ref 3–14)
AST SERPL W P-5'-P-CCNC: 12 U/L (ref 0–45)
BILIRUB SERPL-MCNC: 0.3 MG/DL (ref 0.2–1.3)
BUN SERPL-MCNC: 8 MG/DL (ref 7–30)
CALCIUM SERPL-MCNC: 8.4 MG/DL (ref 8.5–10.1)
CHLORIDE SERPL-SCNC: 106 MMOL/L (ref 94–109)
CO2 SERPL-SCNC: 22 MMOL/L (ref 20–32)
CORTIS SERPL-MCNC: 6.9 UG/DL (ref 4–22)
CREAT SERPL-MCNC: 0.61 MG/DL (ref 0.52–1.04)
FSH SERPL-ACNC: 4.2 IU/L
GFR SERPL CREATININE-BSD FRML MDRD: >90 ML/MIN/1.7M2
GLUCOSE SERPL-MCNC: 83 MG/DL (ref 70–99)
LH SERPL-ACNC: 2.4 IU/L
POTASSIUM SERPL-SCNC: 3.8 MMOL/L (ref 3.4–5.3)
PROLACTIN SERPL-MCNC: 12 UG/L (ref 3–27)
PROT SERPL-MCNC: 7.8 G/DL (ref 6.8–8.8)
PTH-INTACT SERPL-MCNC: 60 PG/ML (ref 12–72)
SODIUM SERPL-SCNC: 137 MMOL/L (ref 133–144)
T4 FREE SERPL-MCNC: 0.81 NG/DL (ref 0.76–1.46)
TSH SERPL DL<=0.005 MIU/L-ACNC: 3.9 MU/L (ref 0.4–4)
VIT B12 SERPL-MCNC: 224 PG/ML (ref 193–986)

## 2017-11-14 ASSESSMENT — PAIN SCALES - GENERAL: PAINLEVEL: NO PAIN (0)

## 2017-11-14 NOTE — MR AVS SNAPSHOT
After Visit Summary   2017    Chance Mazariegos    MRN: [id number]           Patient Information     Date Of Birth          1980        Visit Information        Provider Department      2017 12:00 PM Alta Glez MD [facility]         Follow-ups after your visit        Your next 10 appointments already scheduled     2017  1:30 PM CST   LAB with [facility] ([facility] [place])    [address] [place] [address] [phone]           Please do not eat 10-12 hours before your appointment if you are coming in fasting for labs on lipids, cholesterol, or glucose (sugar). This does not apply to pregnant women. Water, hot tea and black coffee (with nothing added) are okay. Do not drink other fluids, diet soda or chew gum.              Who to contact     Please call your clinic at [phone] to:    Ask questions about your health    Make or cancel appointments    Discuss your medicines    Learn about your test results    Speak to your doctor   If you have compliments or concerns about an experience at your clinic, or if you wish to file a complaint, please contact [facility] Patient Relations at [phone] or email us at [email or web address]         Additional Information About Your Visit        MyChart Information     [employer] is an electronic gateway that provides easy, online access to your medical records. With Inspro, you can request a clinic appointment, read your test results, renew a prescription or communicate with your care team.     To sign up for [employer] visit the website at [email or web address]   You will be asked to enter the access code listed below, as well as some personal information. Please follow the directions to create your username and password.     Your access code is: [email or web address]  Expires: 2017  1:26 AM     Your access code will  in  "90 days. If you need help or a new code, please contact your [facility] or call [phone] for assistance.        Care EveryWhere ID     This is your Care EveryWhere ID. This could be used by other organizations to access your [place] medical records  [id number]        Your Vitals Were     Pulse Height Last Period BMI (Body Mass Index)          85 1.702 m (5' 7\") 10/16/2017 (Exact Date) 37.06 kg/m2         Blood Pressure from Last 3 Encounters:   11/14/17 114/75   11/13/17 128/86   10/31/17 110/68    Weight from Last 3 Encounters:   11/14/17 107.3 kg (236 lb 9.6 oz)   11/13/17 105.7 kg (233 lb)   10/31/17 103.9 kg (229 lb)              Today, you had the following     No orders found for display       Primary Care Provider Office Phone # Fax #    Edyta Paulo Campbell [phone] [address] [facility] [address]  [place] [address]        Equal Access to Services     [facility]: Hadii aad ku hadasho Soomaali, waaxda luqadaha, qaybta kaalmada adeashley, hortencia veronica . So [facility] [phone].    ATENCIÓN: Si habla español, tiene a dickerson disposición servicios gratuitos de asistencia lingüística. Pipo al [phone].    We comply with applicable federal civil rights laws and [place] laws. We do not discriminate on the basis of race, color, national origin, age, disability, sex, sexual orientation, or gender identity.            Thank you!     Thank you for choosing [facility] ENDOCRIN[facility]  for your care. Our goal is always to provide you with excellent care. Hearing back from our patients is one way we can continue to improve our services. Please take a few minutes to complete the written survey that you may receive in the mail after your visit with us. Thank you!             Your Updated Medication List - Protect others around you: Learn how to safely use, store and throw away your medicines at www.disposemymeds.org.          This list is accurate " as of: 11/14/17  1:11 PM.  Always use your most recent med list.                   Brand Name Dispense Instructions for use Diagnosis    ferrous sulfate 325 (65 FE) MG tablet    IRON    90 tablet    Take 1 tablet (325 mg) by mouth daily (with breakfast)    Low iron       IBUPROFEN PO       Left hip pain, Left hip impingement syndrome       vitamin D 71631 UNIT capsule    ERGOCALCIFEROL    8 capsule    Take 1 capsule (50,000 Units) by mouth every 7 days for 8 doses    Vitamin D deficiency

## 2017-11-14 NOTE — PROGRESS NOTES
- Endocrinology Initial Consultation -    Reason for visit/consult:  Hypersensitivity to levothyroxine 25 mcg    Primary care provider: Edyta Campbell    HPI: A 35 yo female here for evaluation of her hypothyroidism. She ws diagnosed 2-3 years ago, did neck MRI, and found out goiter and referred to endocrinologist, was told hashimoto, and blood work confirmed. She was started on levothyroxine 25 mcg by her endocrinologisit, first 1-2 weeks, she started to notice some sort of mild chest tightness each time she takes levothryxoine, but at the same time, she felt great and improved her energy level. after 6 weeks and became sudden tachycardia, no skin rash or edema, found heart is racing, described a butterfly on her chest.   She went to ER, was prescribed beta blocker, then off levothyroxine and fine. Then patient and her endocrinologist tried half pill (levo 12.5 mcg), but same reaction happened. Also they tried tirosint, which did have palpitation again. Tirosint 13 mcg tried same thing happened.     She mentioned she is generally very sensitive to stimulant medications/foods. For example,with too much coffee, shaky, not going away.  Step father  in , she was on lexapro for 1 week, and had similar reactions, racely fluttery, light headed feeling.   No food allergy.     Why she cannot tolerate levothyroixine, and she started to cry. [place]ne Nava moved to [place] in 2015.     In  Intertinal mal-rotation surgery, found by her abdominal pain episodes, in [place], and PMD ordered CT, and required urgent surgery     In [place] PMD found vitamin D was 4 per patient.    Complete off thryoid medication for 1 year. 180 lb now    While she was describing during the sessions, she started to cry three times.          Energy level: low, doing full time work  Dry skin: no much  Hair loss:no change  Weight changes: gained   BM: on  "miralax  Concentration: ok, but feel never rested  Forgetfulness: no  Menstrual cycle: regular  Family history of thyroid disease: mother, grandmother on LT4          ENDO VITALS-[facility] 11/14/2017 11/13/2017 10/31/2017 10/23/2017   Weight 236 lb 9.6 oz 233 lb 229 lb 226 lb     ENDO VITALS-[facility] 10/23/2017 10/18/2017 10/18/2017 10/5/2017   Weight 226 lb 12.8 oz 232 lb 230 lb 228 lb     Past Medical/Surgical History:  Past Medical History:   Diagnosis Date     Hashimoto's thyroiditis      Past Surgical History:   Procedure Laterality Date     intestinal malrotation surgery 2011 2011    Exploratory Laparotomy     SALPINGECTOMY  2011    Performed at the time of her exploratory laparotomy       Allergies:  Allergies   Allergen Reactions     Penicillins        Current Medications   Current Outpatient Prescriptions   Medication     ferrous sulfate (IRON) 325 (65 FE) MG tablet     IBUPROFEN PO     vitamin D (ERGOCALCIFEROL) 00078 UNIT capsule     No current facility-administered medications for this visit.        Family History:  Family History   Problem Relation Age of Onset     Neuropathy Mother      Thyroid Disease Mother      CANCER Father      throat cancer     CEREBROVASCULAR DISEASE Maternal Grandmother      Thyroid Disease Maternal Grandmother    no family history of IBS, celiac,     Social History:  Social History   Substance Use Topics     Smoking status: Never Smoker     Smokeless tobacco: Never Used     Alcohol use No   ,  5 year ago, lives with wife, adaptioh process    ROS:  Full review of systems taken with the help of the intake sheet. Otherwise a complete 14 point review of systems was taken and is negative unless stated in the history above.      Physical Exam:   Blood pressure 114/75, pulse 85, height 1.702 m (5' 7\"), weight 107.3 kg (236 lb 9.6 oz), last menstrual period 10/16/2017, not currently breastfeeding.    General: well appearing, no acute distress, pleasant and conversant, "   Mental Status/neuro: alert and oriented  Face: symmetrical, normal facial color  Eyes: anicteric, PERRL, no proptosis or lid lag  Face: wax hair every 5 weeks  Neck: suppler, no lymphadenopahty  Thyroid: normal size and texture, no nodule palpable, no bruits  Heart: regular rhythm, S1S2, no murmur appreciated  Lung: clear to auscultation bilaterally  Abdomen: soft, NT/ND, no hepatomegaly  Abdminal skin: no striae  Legs: no swelling or edema  Feet: no deformities or ulcers, 2+ DP pulses, normal monofilament sensation      Labs : I reviewed data from epic and extract and summarize the pertinent data here.      5/27/2016 00:00 10/23/2017 11:50   TSH 8.975 (H) 4.23 (H)   T4 Free  0.88     Lab Results   Component Value Date     08/28/2017      Lab Results   Component Value Date    POTASSIUM 3.7 08/28/2017     Lab Results   Component Value Date    CHLORIDE 109 08/28/2017     Lab Results   Component Value Date    MELISSA 8.0 08/28/2017     Lab Results   Component Value Date    CO2 23 08/28/2017     Lab Results   Component Value Date    BUN 11 08/28/2017     Lab Results   Component Value Date    CR 0.69 08/28/2017     Lab Results   Component Value Date    GLC 94 08/28/2017     Lab Results   Component Value Date    TSH 4.23 10/23/2017     Lab Results   Component Value Date    T4 0.88 10/23/2017         Assessment and Plan  36 year old female with tachycardiac episodes by 25 mcg levothyroxine, Hashimoto thyroiditis, mal rotation intestine,   Impression:  Hypersensitivity or tachycardiac reactions due to subtle dose of levothyroxine, tirosint, lexapro, and coffee. Previous low vitaminD 4, intestinal mal rotation surgery, I am not certain but will rule out GI issues (celiac), pitutairy panels, low calcium to be rechecked with PTH. Psychosocial issue is also possible,      - TSH, free T4, TPO  - CMP, VitD, VitB12, PTH  - PRL, LH, FSH, ACTH, cortisol,  - Transglutaminase Abs    I spent 60 minutes with this patient face to  face and explained the conditions and plans (more than 50% of time was counseling/coordination of care) . The patient understood and is satisfied with today's visit. Return to clinic with me PRN.         Alta Glez MD  Staff Physician  Endocrinology and Metabolism  License: [id number]

## 2017-11-14 NOTE — LETTER
2017       RE: Chance Mazariegos  [address] [address]     Dear Colleague,    Thank you for referring your patient, Chance Mazariegos, to the [facility] ENDOCRINOLOGY at [facility]. Please see a copy of my visit note below.    - Endocrinology Initial Consultation -    Reason for visit/consult:  Hypersensitivity to levothyroxine 25 mcg    Primary care provider: Edyta Campbell    HPI: A 35 yo female here for evaluation of her hypothyroidism. She ws diagnosed 2-3 years ago, did neck MRI, and found out goiter and referred to endocrinologist, was told hashimoto, and blood work confirmed. She was started on levothyroxine 25 mcg by her endocrinologisit, first 1-2 weeks, she started to notice some sort of mild chest tightness each time she takes levothryxoine, but at the same time, she felt great and improved her energy level. after 6 weeks and became sudden tachycardia, no skin rash or edema, found heart is racing, described a butterfly on her chest.   She went to ER, was prescribed beta blocker, then off levothyroxine and fine. Then patient and her endocrinologist tried half pill (levo 12.5 mcg), but same reaction happened. Also they tried tirosint, which did have palpitation again. Tirosint 13 mcg tried same thing happened.     She mentioned she is generally very sensitive to stimulant medications/foods. For example,with too much coffee, shaky, not going away.  Step father  in , she was on lexapro for 1 week, and had similar reactions, racely fluttery, light headed feeling.   No food allergy.     Why she cannot tolerate levothyroixine, and she started to cry. [place]rem[place]ne Nava moved to [place] in 2015.     In  Intertinal mal-rotation surgery, found by her abdominal pain episodes, in [place], and PMD ordered CT, and required urgent surgery     In [place] PMD found vitamin D was 4 per patient.    Complete off thryoid medication for 1 year. 180 lb  now    While she was describing during the sessions, she started to cry three times.          Energy level: low, doing full time work  Dry skin: no much  Hair loss:no change  Weight changes: gained   BM: on miralax  Concentration: ok, but feel never rested  Forgetfulness: no  Menstrual cycle: regular  Family history of thyroid disease: mother, grandmother on LT4          ENDO VITALS-UMP 11/14/2017 11/13/2017 10/31/2017 10/23/2017   Weight 236 lb 9.6 oz 233 lb 229 lb 226 lb     ENDO VITALS-UMP 10/23/2017 10/18/2017 10/18/2017 10/5/2017   Weight 226 lb 12.8 oz 232 lb 230 lb 228 lb     Past Medical/Surgical History:  Past Medical History:   Diagnosis Date     Hashimoto's thyroiditis      Past Surgical History:   Procedure Laterality Date     intestinal malrotation surgery 2011 2011    Exploratory Laparotomy     SALPINGECTOMY  2011    Performed at the time of her exploratory laparotomy       Allergies:  Allergies   Allergen Reactions     Penicillins        Current Medications   Current Outpatient Prescriptions   Medication     ferrous sulfate (IRON) 325 (65 FE) MG tablet     IBUPROFEN PO     vitamin D (ERGOCALCIFEROL) 44756 UNIT capsule     No current facility-administered medications for this visit.        Family History:  Family History   Problem Relation Age of Onset     Neuropathy Mother      Thyroid Disease Mother      CANCER Father      throat cancer     CEREBROVASCULAR DISEASE Maternal Grandmother      Thyroid Disease Maternal Grandmother    no family history of IBS, celiac,     Social History:  Social History   Substance Use Topics     Smoking status: Never Smoker     Smokeless tobacco: Never Used     Alcohol use No   ,  5 year ago, lives with wife, adaptioh process    ROS:  Full review of systems taken with the help of the intake sheet. Otherwise a complete 14 point review of systems was taken and is negative unless stated in the history above.      Physical Exam:   Blood pressure 114/75,  "pulse 85, height 1.702 m (5' 7\"), weight 107.3 kg (236 lb 9.6 oz), last menstrual period 10/16/2017, not currently breastfeeding.    General: well appearing, no acute distress, pleasant and conversant,   Mental Status/neuro: alert and oriented  Face: symmetrical, normal facial color  Eyes: anicteric, PERRL, no proptosis or lid lag  Face: wax hair every 5 weeks  Neck: suppler, no lymphadenopahty  Thyroid: normal size and texture, no nodule palpable, no bruits  Heart: regular rhythm, S1S2, no murmur appreciated  Lung: clear to auscultation bilaterally  Abdomen: soft, NT/ND, no hepatomegaly  Abdminal skin: no striae  Legs: no swelling or edema  Feet: no deformities or ulcers, 2+ DP pulses, normal monofilament sensation      Labs : I reviewed data from epic and extract and summarize the pertinent data here.      5/27/2016 00:00 10/23/2017 11:50   TSH 8.975 (H) 4.23 (H)   T4 Free  0.88     Lab Results   Component Value Date     08/28/2017      Lab Results   Component Value Date    POTASSIUM 3.7 08/28/2017     Lab Results   Component Value Date    CHLORIDE 109 08/28/2017     Lab Results   Component Value Date    MELISSA 8.0 08/28/2017     Lab Results   Component Value Date    CO2 23 08/28/2017     Lab Results   Component Value Date    BUN 11 08/28/2017     Lab Results   Component Value Date    CR 0.69 08/28/2017     Lab Results   Component Value Date    GLC 94 08/28/2017     Lab Results   Component Value Date    TSH 4.23 10/23/2017     Lab Results   Component Value Date    T4 0.88 10/23/2017         Assessment and Plan  36 year old female with tachycardiac episodes by 25 mcg levothyroxine, Hashimoto thyroiditis, mal rotation intestine,   Impression:  Hypersensitivity or tachycardiac reactions due to subtle dose of levothyroxine, tirosint, lexapro, and coffee. Previous low vitaminD 4, intestinal mal rotation surgery, I am not certain but will rule out GI issues (celiac), pitutairy panels, low calcium to be rechecked with " PTH. Psychosocial issue is also possible,      - TSH, free T4, TPO  - CMP, VitD, VitB12, PTH  - PRL, LH, FSH, ACTH, cortisol,  - Transglutaminase Abs    I spent 60 minutes with this patient face to face and explained the conditions and plans (more than 50% of time was counseling/coordination of care) . The patient understood and is satisfied with today's visit. Return to clinic with me PRN.         Alta Glez MD  Staff Physician  Endocrinology and Metabolism  License: [id number]

## 2017-11-15 LAB
ACTH PLAS-MCNC: 27 PG/ML
COPATH REPORT: NORMAL
DEPRECATED CALCIDIOL+CALCIFEROL SERPL-MC: 18 UG/L (ref 20–75)
THYROPEROXIDASE AB SERPL-ACNC: 716 IU/ML
TTG IGA SER-ACNC: <1 U/ML
TTG IGG SER-ACNC: <1 U/ML

## 2017-11-21 ENCOUNTER — TELEPHONE (OUTPATIENT)
Dept: ENDOCRINOLOGY | Facility: CLINIC | Age: 37
End: 2017-11-21

## 2017-11-21 NOTE — TELEPHONE ENCOUNTER
Vitamin D and Ca low side, she is taking ergo every week.     Considering to try calcitriol, and I called but she did not  the phone and left message to call back.    Alta Glez MD  Staff Physician  Endocrinology and Metabolism  [facility]  License: [id number]  Pager: [phone]

## 2017-12-06 ENCOUNTER — TELEPHONE (OUTPATIENT)
Dept: ENDOCRINOLOGY | Facility: CLINIC | Age: 37
End: 2017-12-06

## 2017-12-06 NOTE — TELEPHONE ENCOUNTER
Chance is calling for lab results  and  what the plan is going forward. No results letter  done as of yet.

## 2017-12-06 NOTE — TELEPHONE ENCOUNTER
Pt called to inquire 11/14 on lab results  & possible medication or tx options.    Please callback pt at [phone].    Route to [place].

## 2017-12-10 ENCOUNTER — TELEPHONE (OUTPATIENT)
Dept: ENDOCRINOLOGY | Facility: CLINIC | Age: 37
End: 2017-12-10

## 2017-12-10 DIAGNOSIS — E03.9 HYPOTHYROIDISM, UNSPECIFIED TYPE: Primary | ICD-10-CM

## 2017-12-10 RX ORDER — LEVOTHYROXINE SODIUM 25 UG/1
25 TABLET ORAL DAILY
Qty: 30 TABLET | Refills: 1 | Status: SHIPPED | OUTPATIENT
Start: 2017-12-10 | End: 2018-02-20

## 2017-12-10 NOTE — TELEPHONE ENCOUNTER
I talked with the patient.     1. Low VitD 18 : resume ergo 50,000 once a week and recheck    2. Low Ca: Catracal Maximum 3 tab daily (elemental Ca 945mg/day)    3. TSH 3.9, discussd with patient, we will do small dose trial, this time try synthroid 25 mcg.     4. Ordered TFTs, CMP, CBC in case she does not feel well she can go for blood work and call us.     5. Educated to go to urgernt care or ER if she does not feel well and feels she cannto tolerate    6. RTC with me 1 motn    7. If does not work, then will refer to allgery immunologist.      Alta Glez MD  Staff Physician  Endocrinology and Metabolism  [facility]  License: [id number]  Pager: [phone]

## 2018-01-17 ENCOUNTER — OFFICE VISIT (OUTPATIENT)
Dept: FAMILY MEDICINE | Facility: CLINIC | Age: 38
End: 2018-01-17
Payer: COMMERCIAL

## 2018-01-17 VITALS
OXYGEN SATURATION: 97 % | HEART RATE: 82 BPM | SYSTOLIC BLOOD PRESSURE: 106 MMHG | BODY MASS INDEX: 36.51 KG/M2 | DIASTOLIC BLOOD PRESSURE: 73 MMHG | TEMPERATURE: 97.6 F | HEIGHT: 67 IN | WEIGHT: 232.6 LBS

## 2018-01-17 DIAGNOSIS — Z02.9 ENCOUNTERS FOR ADMINISTRATIVE PURPOSE: Primary | ICD-10-CM

## 2018-01-17 DIAGNOSIS — E06.3 HYPOTHYROIDISM DUE TO HASHIMOTO'S THYROIDITIS: ICD-10-CM

## 2018-01-17 PROCEDURE — 99213 OFFICE O/P EST LOW 20 MIN: CPT | Performed by: PHYSICIAN ASSISTANT

## 2018-01-17 NOTE — PROGRESS NOTES
HPI: This is a 36 yo female here for paperwork completion  She and her same sex partner Beatrice attempting to adopt and infant locally and needs paperwork filled out  Moved here from [place] when inherited a house a few years ago  She is a  and still works in [place] so driving back and forth    Pt has hashimotos and is under the care of endo but looking to make a change as has heard good things about Dr. Hines.  Had some trouble tolerating synthroid in the past (tachycardia)  She has been back on synthroid for the past 4 weeks and so far is tolerating this    Results for orders placed or performed in visit on 11/14/17   TSH   Result Value Ref Range    TSH 3.90 0.40 - 4.00 mU/L   T4 free   Result Value Ref Range    T4 Free 0.81 0.76 - 1.46 ng/dL   Thyroid peroxidase antibody   Result Value Ref Range    Thyroid Peroxidase Antibody 716 (H) <35 IU/mL   Comprehensive metabolic panel   Result Value Ref Range    Sodium 137 133 - 144 mmol/L    Potassium 3.8 3.4 - 5.3 mmol/L    Chloride 106 94 - 109 mmol/L    Carbon Dioxide 22 20 - 32 mmol/L    Anion Gap 8 3 - 14 mmol/L    Glucose 83 70 - 99 mg/dL    Urea Nitrogen 8 7 - 30 mg/dL    Creatinine 0.61 0.52 - 1.04 mg/dL    GFR Estimate >90 >60 mL/min/1.7m2    GFR Estimate If Black >90 >60 mL/min/1.7m2    Calcium 8.4 (L) 8.5 - 10.1 mg/dL    Bilirubin Total 0.3 0.2 - 1.3 mg/dL    Albumin 3.9 3.4 - 5.0 g/dL    Protein Total 7.8 6.8 - 8.8 g/dL    Alkaline Phosphatase 59 40 - 150 U/L    ALT 27 0 - 50 U/L    AST 12 0 - 45 U/L   Prolactin   Result Value Ref Range    Prolactin 12 3 - 27 ug/L   Cortisol   Result Value Ref Range    Cortisol Serum 6.9 4 - 22 ug/dL   Adrenal corticotropin   Result Value Ref Range    Adrenal Corticotropin 27 <47 pg/mL   Lutropin   Result Value Ref Range    Lutropin 2.4 IU/L   Follicle stimulating hormone   Result Value Ref Range    FSH 4.2 IU/L   Vitamin D Deficiency   Result Value Ref Range    Vitamin D Deficiency screening 18 (L) 20 - 75 ug/L  "  Parathyroid Hormone Intact   Result Value Ref Range    Parathyroid Hormone Intact 60 12 - 72 pg/mL   Tissue transglutaminase vinnie IgA and IgG   Result Value Ref Range    Tissue Transglutaminase Antibody IgA <1 <7 U/mL    Tissue Transglutaminase Vinnie IgG <1 <7 U/mL       Past Medical History:   Diagnosis Date     Hashimoto's thyroiditis      Past Surgical History:   Procedure Laterality Date     intestinal malrotation surgery 2011 2011    Exploratory Laparotomy     SALPINGECTOMY  2011    Performed at the time of her exploratory laparotomy     Social History   Substance Use Topics     Smoking status: Never Smoker     Smokeless tobacco: Never Used     Alcohol use No     Current Outpatient Prescriptions   Medication Sig Dispense Refill     cholecalciferol (VITAMIN D3) 55498 UNITS capsule Take 1 capsule (50,000 Units) by mouth once a week       levothyroxine (SYNTHROID) 25 MCG tablet Take 1 tablet (25 mcg) by mouth daily 30 tablet 1     Allergies   Allergen Reactions     Penicillins      FAMILY HISTORY NOTED AND REVIEWED      PHYSICAL EXAM:    /73 (BP Location: Right arm, Patient Position: Chair, Cuff Size: Adult Large)  Pulse 82  Temp 97.6  F (36.4  C) (Oral)  Ht 5' 7\" (1.702 m)  Wt 232 lb 9.6 oz (105.5 kg)  LMP 12/18/2017  SpO2 97%  Breastfeeding? No  BMI 36.43 kg/m2    Patient appears non toxic    Assessment and Plan:     (Z02.9) Encounters for administrative purpose  (primary encounter diagnosis)  Comment:   Plan: completed forms for adoption    (E03.8,  E06.3) Hypothyroidism due to Hashimoto's thyroiditis  Comment:   Plan: she is tolerating her synthroid so far but interested in switching to Dr. Hines    Spent 20 minutes FTF with patient of which over 50% was spent discussing the coordination of care and management of their issues noted.        Jemima Valdes PA-C          "

## 2018-01-17 NOTE — MR AVS SNAPSHOT
"              After Visit Summary   1/17/2018    Chance Mazariegos    MRN: [id number]           Patient Information     Date Of Birth          1980        Visit Information        Provider Department      1/17/2018 9:00 AM Jemima Valdes PA-C [facility]        Today's Diagnoses     Encounters for administrative purpose    -  1    Hypothyroidism due to Hashimoto's thyroiditis           Follow-ups after your visit        Your next 10 appointments already scheduled     Feb 20, 2018  9:00 AM CST   Office Visit with João Whittington MD   [facility] ([facility])    [address] [facility] [address] [phone]           Bring a current list of meds and any records pertaining to this visit. For Physicals, please bring immunization records and any forms needing to be filled out. Please arrive 10 minutes early to complete paperwork.              Who to contact     If you have questions or need follow up information about today's clinic visit or your schedule please contact [facility] directly at [phone].  Normal or non-critical lab and imaging results will be communicated to you by MyChart, letter or phone within 4 business days after the clinic has received the results. If you do not hear from us within 7 days, please contact the clinic through [employer] or phone. If you have a critical or abnormal lab result, we will notify you by phone as soon as possible.  Submit refill requests through [employer] or call your pharmacy and they will forward the refill request to us. Please allow 3 business days for your refill to be completed.          Additional Information About Your Visit        MyChart Information     [employer] lets you send messages to your doctor, view your test results, renew your prescriptions, schedule appointments and more. To sign up, go to [email or web address] . Click on \"Log in\" on the left side of the screen, which will take you to the " "Welcome page. Then click on \"Sign up Now\" on the right side of the page.     You will be asked to enter the access code listed below, as well as some personal information. Please follow the directions to create your username and password.     Your access code is: [email or web address]  Expires: 2018 11:35 AM     Your access code will  in 90 days. If you need help or a new code, please call your [place] clinic or [phone].        Care EveryWhere ID     This is your Care EveryWhere ID. This could be used by other organizations to access your [place] medical records  [id number]        Your Vitals Were     Pulse Temperature Height Last Period Pulse Oximetry Breastfeeding?    82 97.6  F (36.4  C) (Oral) 5' 7\" (1.702 m) 2017 97% No    BMI (Body Mass Index)                   36.43 kg/m2            Blood Pressure from Last 3 Encounters:   18 106/73   17 114/75   17 128/86    Weight from Last 3 Encounters:   18 232 lb 9.6 oz (105.5 kg)   17 236 lb 9.6 oz (107.3 kg)   17 233 lb (105.7 kg)              Today, you had the following     No orders found for display         Today's Medication Changes          These changes are accurate as of: 18 11:35 AM.  If you have any questions, ask your nurse or doctor.               Stop taking these medicines if you haven't already. Please contact your care team if you have questions.     ferrous sulfate 325 (65 FE) MG tablet   Commonly known as:  IRON   Stopped by:  Jemima Valdes PA-C           IBUPROFEN PO   Stopped by:  Jemima Valdes PA-C                    Primary Care Provider Office Phone # Fax #    Jemima Valdes PA-C [phone] [address]  [place] [address]        Equal Access to Services     LUCY GLASER AH: Josias Ashford, kwabena patel, qahortencia pereirash la'aan ah. [facility] [phone].    ATENCIÓN: Si tay kenney a dickerson " disposición servicios gratuitos de asistencia lingüística. Pipo yanez [phone].    We comply with applicable federal civil rights laws and [place] laws. We do not discriminate on the basis of race, color, national origin, age, disability, sex, sexual orientation, or gender identity.            Thank you!     Thank you for choosing [facility]  for your care. Our goal is always to provide you with excellent care. Hearing back from our patients is one way we can continue to improve our services. Please take a few minutes to complete the written survey that you may receive in the mail after your visit with us. Thank you!             Your Updated Medication List - Protect others around you: Learn how to safely use, store and throw away your medicines at www.disposemymeds.org.          This list is accurate as of: 1/17/18 11:35 AM.  Always use your most recent med list.                   Brand Name Dispense Instructions for use Diagnosis    cholecalciferol 18088 UNITS capsule    VITAMIN D3     Take 1 capsule (50,000 Units) by mouth once a week        levothyroxine 25 MCG tablet    SYNTHROID    30 tablet    Take 1 tablet (25 mcg) by mouth daily    Hypothyroidism, unspecified type

## 2018-01-17 NOTE — NURSING NOTE
"Chief Complaint   Patient presents with     Forms       Initial /73 (BP Location: Right arm, Patient Position: Chair, Cuff Size: Adult Large)  Pulse 82  Temp 97.6  F (36.4  C) (Oral)  Ht 5' 7\" (1.702 m)  Wt 232 lb 9.6 oz (105.5 kg)  LMP 12/18/2017  SpO2 97%  Breastfeeding? No  BMI 36.43 kg/m2 Estimated body mass index is 36.43 kg/(m^2) as calculated from the following:    Height as of this encounter: 5' 7\" (1.702 m).    Weight as of this encounter: 232 lb 9.6 oz (105.5 kg).  Medication Reconciliation: complete.  Barb Oneal CMA    "

## 2018-02-20 ENCOUNTER — TELEPHONE (OUTPATIENT)
Dept: ENDOCRINOLOGY | Facility: CLINIC | Age: 38
End: 2018-02-20

## 2018-02-20 ENCOUNTER — OFFICE VISIT (OUTPATIENT)
Dept: ENDOCRINOLOGY | Facility: CLINIC | Age: 38
End: 2018-02-20
Payer: COMMERCIAL

## 2018-02-20 VITALS
WEIGHT: 232 LBS | BODY MASS INDEX: 36.41 KG/M2 | HEIGHT: 67 IN | HEART RATE: 82 BPM | DIASTOLIC BLOOD PRESSURE: 81 MMHG | SYSTOLIC BLOOD PRESSURE: 120 MMHG

## 2018-02-20 DIAGNOSIS — E55.9 VITAMIN D DEFICIENCY: Primary | ICD-10-CM

## 2018-02-20 DIAGNOSIS — E06.3 HASHIMOTO'S THYROIDITIS: ICD-10-CM

## 2018-02-20 DIAGNOSIS — Z83.49 FAMILY HISTORY OF THYROID DISEASE: ICD-10-CM

## 2018-02-20 PROCEDURE — 99204 OFFICE O/P NEW MOD 45 MIN: CPT | Performed by: INTERNAL MEDICINE

## 2018-02-20 RX ORDER — LEVOTHYROXINE SODIUM 25 UG/1
TABLET ORAL
Qty: 15 TABLET | Refills: 5 | Status: SHIPPED | OUTPATIENT
Start: 2018-02-20 | End: 2018-07-10

## 2018-02-20 RX ORDER — ERGOCALCIFEROL 1.25 MG/1
CAPSULE, LIQUID FILLED ORAL
Qty: 8 CAPSULE | Refills: 3 | Status: SHIPPED | OUTPATIENT
Start: 2018-02-20

## 2018-02-20 NOTE — PROGRESS NOTES
"Name: Chance Mazariegos    Patient seen at the request of Jemima DESAI for evaluation of Hashimoto's disease, possible hypothyroidism    Chief Complaint   Patient presents with     Thyroid Problem     HPI:  Recent issues:  Here for thyroid evaluation.        Thyroid:  ~2014. Diagnosed with Hashimoto's thyroiditis  Recalls having neck \"issues\", had MRI scan and told her thyroid enlarged  Saw an endocrinologist in [place] area, plan for FNA biopsy but procedure not actually done  Thyroid u/s done  Had additional lab tests which included high thyroid antibody level  Began thyroid medication as levothyroxine 0.025 mg QD   Initially felt much better   Developed symptoms of tachycardia, sweats, anxiety   Dose reduced to levothyroxine 0.025 mg 1/2 tab dose but symptoms reoccurred  5/27/16 labs:  TSH 8.975  Restarted thyroid medication as Tirosint medication  Spring 2017. Developed symptoms and med discontinued  10/23/17 labs:  TSH 4.23, FT4 0.088  11/14/17. Med evaluation with Dr. Alta Glez/[facility]  Labs:  TSH 3.90, FT4 0.81, TPOAb 716, vit D 18  Advised to take higher dose Synthroid 0.075 mg QD... But took Synthroid 0.025 QD   Developed symptoms of tachycardia, sweats, dizziness, anxiety  1/24/18. Stopped the Synthroid after approx 6 weeks use  1/26/18. ER evaluation in [place] for symptoms  Hyperactive symptoms have resolved.    Now feels wt gain, coldness, joint aches- legs and neck  FamHx thyroid:  Hypothyroidism- M, MGM  Current thyroid med dose:  none    Vit D deficiency:  History of low vitamin D levels  2010. Intestinal malrotation surgery   2014. Recalls vit D level \"4\"  Has taken vit D 50,000 IU weekly doses (most of the time) for many years  11/14/17 labs:  Vit D level 18  Continued vit D 50,000 IU weekly, consistent dosing    Sees Jemima DESAI/ [facility] for gen med evaluations  Also sees orthopedic massage therapist    PMH/PSH:  Past Medical History:   Diagnosis Date     Hashimoto's " thyroiditis      Past Surgical History:   Procedure Laterality Date     intestinal malrotation surgery 2011 2011    Exploratory Laparotomy     SALPINGECTOMY  2011    Performed at the time of her exploratory laparotomy       Family Hx:  Family History   Problem Relation Age of Onset     Neuropathy Mother      Thyroid Disease Mother      CANCER Father      throat cancer     CEREBROVASCULAR DISEASE Maternal Grandmother      Thyroid Disease Maternal Grandmother          Social Hx:  Social History     Social History     Marital status:      Spouse name: N/A     Number of children: N/A     Years of education: N/A     Occupational History     Not on file.     Social History Main Topics     Smoking status: Never Smoker     Smokeless tobacco: Never Used     Alcohol use No     Drug use: No     Sexual activity: Yes     Partners: Female     Birth control/ protection: None     Other Topics Concern     Not on file     Social History Narrative    Same Sex relationship. Partner is Beatrice. Moved to [place] from [place] after inheriting a house from Beatrice's relative. Looking into adoption. Beatrice had endometrial hyperplasia after miscarriage with Chime's egg.     Denies intimate partner violence.          MEDICATIONS:  has a current medication list which includes the following prescription(s): cholecalciferol, levothyroxine, and vitamin d.    ROS:     ROS: 10 point ROS neg other than the symptoms noted above in the HPI.    GENERAL: no weight changes, some fatigue; denies fevers, chills, night sweats.   HEENT: no dysphagia, odonophagia, diplopia, neck pain  THYROID:  no apparent hyper or hypothyroid symptoms  CV: no recent chest pain, pressure, palpitations  LUNGS: no SOB, ESPINOZA, cough, wheezing   ABDOMEN: constipation; no diarrhea or abdominal pain  EXTREMITIES: no rashes, ulcers, edema  NEUROLOGY: no headaches, denies changes in vision, tingling, extremitiy numbness   MSK: some leg aching; no muscle aches or pains,  "weakness  SKIN: no rashes or lesions  :  Menses generally regular   PSYCH:  stable mood, no significant anxiety or depression  ENDOCRINE: no heat or cold intolerance    Physical Exam   VS: /81 (BP Location: Right arm, Cuff Size: Adult Regular)  Pulse 82  Ht 5' 7\" (1.702 m)  Wt 232 lb (105.2 kg)  BMI 36.34 kg/m2  GENERAL: AXOX3, NAD, well dressed, answering questions appropriately, appears stated age.  THYROID:  normal gland, no apparent nodules or goiter  HEENT: neck non-tender, no exopthalmous, no proptosis, EOMI  CV: RRR, no rubs, gallops, no murmurs  LUNGS: CTAB, no wheezes, rales, or ronchi  ABDOMEN: mildly obese, soft, nontender, nondistended, no organomegaly  EXTREMITIES: no edema, +pedal pulses, no lesions  NEUROLOGY: CN grossly intact, no tremors  MSK: grossly intact  SKIN: no rashes, no lesions    LABS:    All pertinent notes, labs, and images personally reviewed by me.     A/P:  Encounter Diagnoses   Name Primary?     Hashimoto's thyroiditis      Vitamin D deficiency Yes     Comments:  Unusual thyroid history and symptoms with low dose levothyroxine medication, for Hashimoto's thyroiditis  Although history of mild primary hypothyroidism, no recent evidence for hypothyroidism  Low vit D levels likely related to her leg weakness and arthralgia symptoms also    Plan:  Discussed general issues with the previous hypothyroidism and Hashimoto's thyroiditis diagnosis, management  Reviewed thyroid gland anatomy and hormone physiology  Discussed lab tests used to assess patient thyroid hormone levels  Reviewed treatment options including levothyroxine medication, ideal dosing    Recommend:  Reviewed potential benefits of restarting low dose thyroid medication, med and dosing options  Advised starting levothyroxine 0.025 mg 1/2-tab po QOD, new Rx sent to pharmacy    Take thyroid med at different time than iron pill or calcium pills  Monitor for symptom changes    Encourage additional vit D supplement use, " to normalize vit D levels  Increase dose vit D (ergocalciferol) 50,000 IU capsule twice per week, new Rx sent to pharmacy  Message/call me if questions    Addressed patient questions today    Labs ordered today: No orders of the defined types were placed in this encounter.    Radiology/Consults ordered today: None    More than 50% of the time spent with Ms. Mazariegos on counseling / coordinating her care.  Total appointment time was 45 minutes.    Follow-up:  6-8 weeks     João Whittington MD  Endocrinology  [place] [facility]  CC: Jemima Valdes

## 2018-02-20 NOTE — NURSING NOTE
"Chief Complaint   Patient presents with     Thyroid Problem       Initial /81 (BP Location: Right arm, Cuff Size: Adult Regular)  Pulse 82  Ht 5' 7\" (1.702 m)  Wt 232 lb (105.2 kg)  BMI 36.34 kg/m2 Estimated body mass index is 36.34 kg/(m^2) as calculated from the following:    Height as of this encounter: 5' 7\" (1.702 m).    Weight as of this encounter: 232 lb (105.2 kg).  Medication Reconciliation: complete         Everett Drake      "

## 2018-02-20 NOTE — TELEPHONE ENCOUNTER
Solomon, pharmacist [facility] [address] [place], requesting change from Vitamin D3 to Vitamin D2 (ergocalciferol)  As the pt has been getting the D2 in the past and pharmacy is out of the D3.  Nicole ph# [phone]

## 2018-02-20 NOTE — LETTER
"    2/20/2018         RE: Chance Mazariegos  [address] [address]        Dear Colleague,    Thank you for referring your patient, Chance Mazariegos, to the [facility]. Please see a copy of my visit note below.    Name: Chance Mazariegos    Patient seen at the request of Jemima DESAI for evaluation of Hashimoto's disease, possible hypothyroidism    Chief Complaint   Patient presents with     Thyroid Problem     HPI:  Recent issues:  Here for thyroid evaluation.        ~2014. Diagnosed with Hashimoto's thyroiditis  Recalls having neck \"issues\", had MRI scan and told her thyroid enlarged  Saw an endocrinologist in [place] area, plan for FNA biopsy but procedure not actually done  Thyroid u/s done  Had additional lab tests which included high thyroid antibody level  Began thyroid medication as levothyroxine 0.025 mg QD   Initially felt much better   Developed symptoms of tachycardia, sweats, anxiety   Dose reduced to levothyroxine 0.025 mg 1/2 tab dose but symptoms reoccurred  Approx 8 mo later, restarted thyroid medication as Tirosint medication  Spring 2017. Developed symptoms and med discontinued  [facility] advised to take higher dose Synthroid 0.075 mg QD... But took Synthroid 0.025 QD   Developed symptoms of tachycardia, sweats, dizziness, anxiety  1/24/18. Stopped the Synthroid after approx 6 weeks use  1/26/18. ER evaluation in [place] for symptoms  Hyperactive symptoms have resolved.    Now feels wt gain, coldness, joint aches- legs and neck  Current dose:  None    FamHx thyroid:  Hypothyroidism- M, MGM    Sees Jemima [facility]AI/[facility] for [facility] med evaluations  Also sees orthopedic massage therapist    PMH/PSH:  Past Medical History:   Diagnosis Date     Hashimoto's thyroiditis      Past Surgical History:   Procedure Laterality Date     intestinal malrotation surgery 2011 2011    Exploratory Laparotomy     SALPINGECTOMY  2011    Performed at the time of her " exploratory laparotomy       Family Hx:  Family History   Problem Relation Age of Onset     Neuropathy Mother      Thyroid Disease Mother      CANCER Father      throat cancer     CEREBROVASCULAR DISEASE Maternal Grandmother      Thyroid Disease Maternal Grandmother          Social Hx:  Social History     Social History     Marital status:      Spouse name: N/A     Number of children: N/A     Years of education: N/A     Occupational History     Not on file.     Social History Main Topics     Smoking status: Never Smoker     Smokeless tobacco: Never Used     Alcohol use No     Drug use: No     Sexual activity: Yes     Partners: Female     Birth control/ protection: None     Other Topics Concern     Not on file     Social History Narrative    Same Sex relationship. Partner is Beatrice. Moved to [place] from [place] after inheriting a house from Beatrice's relative. Looking into adoption. Beatrice had endometrial hyperplasia after miscarriage with Chime's egg.     Denies intimate partner violence.          MEDICATIONS:  has a current medication list which includes the following prescription(s): cholecalciferol, levothyroxine, and vitamin d.    ROS:     ROS: 10 point ROS neg other than the symptoms noted above in the HPI.    GENERAL: no weight changes, some fatigue; denies fevers, chills, night sweats.   HEENT: no dysphagia, odonophagia, diplopia, neck pain  THYROID:  no apparent hyper or hypothyroid symptoms  CV: no recent chest pain, pressure, palpitations  LUNGS: no SOB, ESPINOZA, cough, wheezing   ABDOMEN: no diarrhea, constipation, abdominal pain  EXTREMITIES: no rashes, ulcers, edema  NEUROLOGY: no headaches, denies changes in vision, tingling, extremitiy numbness   MSK: no muscle aches or pains, weakness  SKIN: no rashes or lesions  :  Menses generally regular   PSYCH:  stable mood, no significant anxiety or depression  ENDOCRINE: no heat or cold intolerance    Physical Exam   VS: /81 (BP Location: Right  "arm, Cuff Size: Adult Regular)  Pulse 82  Ht 5' 7\" (1.702 m)  Wt 232 lb (105.2 kg)  BMI 36.34 kg/m2  GENERAL: AXOX3, NAD, well dressed, answering questions appropriately, appears stated age.  THYROID:  normal gland, no apparent nodules or goiter  HEENT: neck non-tender, no exopthalmous, no proptosis, EOMI  CV: RRR, no rubs, gallops, no murmurs  LUNGS: CTAB, no wheezes, rales, or ronchi  ABDOMEN: mildly obese, soft, nontender, nondistended, no organomegaly  EXTREMITIES: no edema, +pedal pulses, no lesions  NEUROLOGY: CN grossly intact, no tremors  MSK: grossly intact  SKIN: no rashes, no lesions    LABS:    All pertinent notes, labs, and images personally reviewed by me.     A/P:  Encounter Diagnoses   Name Primary?     Hashimoto's thyroiditis      Vitamin D deficiency Yes     Comments:  Unusual thyroid history and symptoms with low dose levothyroxine medication, for Hashimoto's thyroiditis  No evidence for hypothyroidism (based on available records), but at risk for this condition.    Plan:  Discussed general issues with the hypothyroidism diagnosis and management  Reviewed thyroid gland anatomy and hormone physiology  Discussed lab tests used to assess patient thyroid hormone levels  Reviewed treatment options including levothyroxine medication, ideal dosing    Recommend:  Reviewed potential benefits of restarting low dose thyroid medication, med and dosing options  Advised starting levothyroxine 0.025 mg 1/2-tab po QOD, new Rx sent to pharmacy    Take thyroid med at different time than iron pill or calcium pills  Monitor for symptom changes    Encourage additional vit D supplement use, to normalize vit D levels  Increase dose vit D (ergocalciferol) 50,000 IU capsule twice per week, new Rx sent to pharmacy  Message/call me if questions    Addressed patient questions today    Labs ordered today: No orders of the defined types were placed in this encounter.    Radiology/Consults ordered today: None    More than " 50% of the time spent with Ms. Mazariegos on counseling / coordinating her care.  Total appointment time was 45 minutes.    Follow-up:  2 mo     João Whittington MD  Endocrinology  [facility]  CC: Jemima Valdes       Again, thank you for allowing me to participate in the care of your patient.        Sincerely,        João Whittington MD

## 2018-02-20 NOTE — MR AVS SNAPSHOT
"              After Visit Summary   2/20/2018    Chance Mazariegos    MRN: [id number]           Patient Information     Date Of Birth          1980        Visit Information        Provider Department      2/20/2018 9:00 AM João Whittington MD [facility]        Today's Diagnoses     Vitamin D deficiency    -  1    Hashimoto's thyroiditis        Family history of thyroid disease           Follow-ups after your visit        Follow-up notes from your care team     Return in about 6 weeks (around 4/3/2018).      Your next 10 appointments already scheduled     Apr 06, 2018 11:30 AM CDT   Return Visit with João Whittington MD   [facility] ([facility])    [address] [place] [id number] [phone]              Who to contact     If you have questions or need follow up information about today's clinic visit or your schedule please contact [facility] directly at [phone].  Normal or non-critical lab and imaging results will be communicated to you by MyChart, letter or phone within 4 business days after the clinic has received the results. If you do not hear from us within 7 days, please contact the clinic through [facility] or phone. If you have a critical or abnormal lab result, we will notify you by phone as soon as possible.  Submit refill requests through [employer] or call your pharmacy and they will forward the refill request to us. Please allow 3 business days for your refill to be completed.          Additional Information About Your Visit        MyChart Information     [facility] lets you send messages to your doctor, view your test results, renew your prescriptions, schedule appointments and more. To sign up, go to [email or web address] . Click on \"Log in\" on the left side of the screen, which will take you to the Welcome page. Then click on \"Sign up Now\" on the right side of the page.     You will be asked to enter the access code " "listed below, as well as some personal information. Please follow the directions to create your username and password.     Your access code is: [email or web address]  Expires: 2018 11:35 AM     Your access code will  in 90 days. If you need help or a new code, please call your [facility] clinic or [phone].        Care EveryWhere ID     This is your Care EveryWhere ID. This could be used by other organizations to access your [facility] medical records  [id number]        Your Vitals Were     Pulse Height BMI (Body Mass Index)             82 5' 7\" (1.702 m) 36.34 kg/m2          Blood Pressure from Last 3 Encounters:   18 120/81   18 106/73   17 114/75    Weight from Last 3 Encounters:   18 232 lb (105.2 kg)   18 232 lb 9.6 oz (105.5 kg)   17 236 lb 9.6 oz (107.3 kg)              Today, you had the following     No orders found for display         Today's Medication Changes          These changes are accurate as of 18  8:03 PM.  If you have any questions, ask your nurse or doctor.               Start taking these medicines.        Dose/Directions    levothyroxine 25 MCG tablet   Commonly known as:  SYNTHROID/LEVOTHROID   Started by:  João Whittington MD        Take 1/2 tablet by mouth daily, as directed   Quantity:  15 tablet   Refills:  5       vitamin D 90072 UNIT capsule   Commonly known as:  ERGOCALCIFEROL   Used for:  Vitamin D deficiency   Started by:  João Whittnigton MD        Take one capsule by mouth twice per week, as directed   Quantity:  8 capsule   Refills:  3         These medicines have changed or have updated prescriptions.        Dose/Directions    cholecalciferol 60688 UNITS capsule   Commonly known as:  VITAMIN D3   This may have changed:    - how much to take  - how to take this  - when to take this  - additional instructions   Used for:  Vitamin D deficiency   Changed by:  João Whittington MD        Take 1 capsule by mouth twice per week, as " directed   Quantity:  8 capsule   Refills:  5            Where to get your medicines      These medications were sent to [id number] - [place], [place] - [address] AT [facility] [address]  [address], [facility] [place] [address]     Phone:  [phone]     cholecalciferol 19938 UNITS capsule    levothyroxine 25 MCG tablet    vitamin D 55194 UNIT capsule                Primary Care Provider Office Phone # Fax #    Jemima Valdes PA-C [phone] [address]  [facility] [address]        Equal Access to Services     [facility]: Hadii aad ku hadasho Soomaali, waaxda luqadaha, qaybta kaalmada adeegyada, waxstephane sibley haysun sepulveda. So [facility] [phone].    ATENCIÓN: Si habla español, tiene a dickerson disposición servicios gratuitos de asistencia lingüística. [facility] [phone].    We comply with applicable federal civil rights laws and [place] laws. We do not discriminate on the basis of race, color, national origin, age, disability, sex, sexual orientation, or gender identity.            Thank you!     Thank you for choosing [facility]  for your care. Our goal is always to provide you with excellent care. Hearing back from our patients is one way we can continue to improve our services. Please take a few minutes to complete the written survey that you may receive in the mail after your visit with us. Thank you!             Your Updated Medication List - Protect others around you: Learn how to safely use, store and throw away your medicines at www.disposemymeds.org.          This list is accurate as of 2/20/18  8:03 PM.  Always use your most recent med list.                   Brand Name Dispense Instructions for use Diagnosis    cholecalciferol 05992 UNITS capsule    VITAMIN D3    8 capsule    Take 1 capsule by mouth twice per week, as directed    Vitamin D deficiency       levothyroxine 25 MCG tablet    SYNTHROID/LEVOTHROID    15 tablet     Take 1/2 tablet by mouth daily, as directed        vitamin D 21265 UNIT capsule    ERGOCALCIFEROL    8 capsule    Take one capsule by mouth twice per week, as directed    Vitamin D deficiency

## 2018-04-06 ENCOUNTER — OFFICE VISIT (OUTPATIENT)
Dept: ENDOCRINOLOGY | Facility: CLINIC | Age: 38
End: 2018-04-06
Payer: COMMERCIAL

## 2018-04-06 VITALS
HEART RATE: 73 BPM | HEIGHT: 67 IN | DIASTOLIC BLOOD PRESSURE: 75 MMHG | WEIGHT: 232 LBS | BODY MASS INDEX: 36.41 KG/M2 | SYSTOLIC BLOOD PRESSURE: 121 MMHG

## 2018-04-06 DIAGNOSIS — R42 SPELL OF DIZZINESS: ICD-10-CM

## 2018-04-06 DIAGNOSIS — E06.3 HASHIMOTO'S THYROIDITIS: Primary | ICD-10-CM

## 2018-04-06 DIAGNOSIS — E55.9 VITAMIN D DEFICIENCY: ICD-10-CM

## 2018-04-06 PROCEDURE — 99214 OFFICE O/P EST MOD 30 MIN: CPT | Performed by: INTERNAL MEDICINE

## 2018-04-06 NOTE — NURSING NOTE
"Chief Complaint   Patient presents with     Thyroid Problem       Initial /75 (BP Location: Right arm, Cuff Size: Adult Regular)  Pulse 73  Ht 5' 7\" (1.702 m)  Wt 232 lb (105.2 kg)  BMI 36.34 kg/m2 Estimated body mass index is 36.34 kg/(m^2) as calculated from the following:    Height as of this encounter: 5' 7\" (1.702 m).    Weight as of this encounter: 232 lb (105.2 kg).  Medication Reconciliation: complete       Everett Drake      "

## 2018-04-06 NOTE — PROGRESS NOTES
"Name: Chance Mazariegos    Chief Complaint   Patient presents with     Thyroid Problem     Lab Result Notice     low vitamin D level     HPI:  Recent issues:  Here for thyroid evaluation.  Had recurrence of symptoms in late March '18, decided to stop the low dose thyroid pill plan        Thyroid:  ~2014. Diagnosed with Hashimoto's thyroiditis  Recalls having neck \"issues\", had MRI scan and told her thyroid enlarged  Saw an endocrinologist in [place] area, plan for FNA biopsy but procedure not actually done  Thyroid u/s done  Had additional lab tests which included high thyroid antibody level  Began thyroid medication as levothyroxine 0.025 mg QD   Initially felt much better   Developed symptoms of tachycardia, sweats, anxiety   Dose reduced to levothyroxine 0.025 mg 1/2 tab dose but symptoms reoccurred  5/27/16 labs:  TSH 8.975  Restarted thyroid medication as Tirosint medication  Spring 2017. Developed symptoms and med discontinued  10/23/17 labs:  TSH 4.23, FT4 0.088  11/14/17. Med evaluation with Dr. Alta Glez/[facility]  Labs:  TSH 3.90, FT4 0.81, TPOAb 716, vit D 18  Advised to take higher dose Synthroid 0.075 mg QD... But took Synthroid 0.025 QD   Developed symptoms of tachycardia, sweats, dizziness, anxiety  1/24/18. Stopped the Synthroid after approx 6 weeks use  1/26/18. ER evaluation in [place] for symptoms  Labs:  Normal TSH 2.43, TT4 6.9, FT3 2.6, TT3 1.07, cortisol 23.6  States the hyperactive symptoms have resolved.    Had symptoms of wt gain, coldness, joint aches- legs and neck  FamHx thyroid:  Hypothyroidism- M, MGM  Lab Results   Component Value Date    TSH 3.90 11/14/2017    T4 0.81 11/14/2017     (H) 11/14/2017 2/20/18. Initial evaluation with me.  Discussed plan to start low dose levothyroxine again.  Had taken Levothyroxine 0.025 mg 1/2 tab QOD  Recalls having dizziness symptoms approx 4-weeks after starting thyroid med (~3/20/18), discontinued it  Not taking thyroid " "medication currently      Vit D deficiency:  History of low vitamin D levels  2010. Intestinal malrotation surgery   2014. Recalls vit D level \"4\"  Has taken vit D 50,000 IU weekly doses (most of the time) for many years  11/14/17 labs:  Vit D level 18  Continued vit D 50,000 IU weekly, consistent dosing  Needs f/u lab testing soon    Sees Jemima Valdes PAC/[facility] for gen med evaluations  Also sees orthopedic massage therapist    PMH/PSH:  Past Medical History:   Diagnosis Date     Hashimoto's thyroiditis      Past Surgical History:   Procedure Laterality Date     intestinal malrotation surgery 2011 2011    Exploratory Laparotomy     SALPINGECTOMY  2011    Performed at the time of her exploratory laparotomy       Family Hx:  Family History   Problem Relation Age of Onset     Neuropathy Mother      Thyroid Disease Mother      CANCER Father      throat cancer     CEREBROVASCULAR DISEASE Maternal Grandmother      Thyroid Disease Maternal Grandmother          Social Hx:  Social History     Social History     Marital status:      Spouse name: N/A     Number of children: N/A     Years of education: N/A     Occupational History     Not on file.     Social History Main Topics     Smoking status: Never Smoker     Smokeless tobacco: Never Used     Alcohol use No     Drug use: No     Sexual activity: Yes     Partners: Female     Birth control/ protection: None     Other Topics Concern     Not on file     Social History Narrative    Same Sex relationship. Partner is Beatrice. Moved to [place] from [place] after inheriting a house from Beatrice's relative. Looking into adoption. Beatrice had endometrial hyperplasia after miscarriage with Chime's egg.     Denies intimate partner violence.          MEDICATIONS:  has a current medication list which includes the following prescription(s): levothyroxine and vitamin d.    ROS:     ROS: 10 point ROS neg other than the symptoms noted above in the HPI.    GENERAL: no weight " "changes, some fatigue; denies fevers, chills, night sweats.   HEENT: no dysphagia, odonophagia, diplopia, neck pain  THYROID:  no apparent hyper or hypothyroid symptoms  CV: no recent chest pain, pressure, palpitations  LUNGS: no SOB, ESPINOZA, cough, wheezing   ABDOMEN: constipation; no diarrhea or abdominal pain  EXTREMITIES: no rashes, ulcers, edema  NEUROLOGY: no headaches, denies changes in vision, tingling, extremitiy numbness   MSK: some leg aching; no muscle aches or pains, weakness  SKIN: no rashes or lesions  :  Menses generally regular   PSYCH:  stable mood, no significant anxiety or depression  ENDOCRINE: no heat or cold intolerance    Physical Exam   VS: /75 (BP Location: Right arm, Cuff Size: Adult Regular)  Pulse 73  Ht 5' 7\" (1.702 m)  Wt 232 lb (105.2 kg)  BMI 36.34 kg/m2  GENERAL: AXOX3, NAD, well dressed, answering questions appropriately, appears stated age.  THYROID:  normal gland, no apparent nodules or goiter  ABDOMEN: mildly obese, soft, nontender, nondistended, no organomegaly    LABS:    All pertinent notes, labs, and images personally reviewed by me.     A/P:  Encounter Diagnoses   Name Primary?     Hashimoto's thyroiditis Yes     Vitamin D deficiency      Spell of dizziness      Comments:  Reviewed health history, thyroid and vit D management issues.  Complicated case.  No evidence for elevated thyroid levels with her spells, despite use of (low dose) thyroid med used    Plan:  Discussed general issues with the previous hypothyroidism and Hashimoto's thyroiditis diagnosis, management  We discussed lab tests used to assess patient thyroid hormone levels  Reviewed treatment options with levothyroxine medication, ideal dosing    Recommend:  We discussed several management options  Advised restarting even lower dose thyroid med as levothyroxine 0.025 mg 1/2-tab po 2 days/week (Mon & Fridays)  Monitor for symptom changes or spells  Take thyroid med at different time than iron pill or " calcium pills  Advised repeating thyroid levels today    Hope the additional vit D supplement use, to normalize vit D levels  Check lab tests for vit D today/soon  Continue the vitam D ergocalciferol 50,000 IU capsule twice per week, though anticipate dose reducation plan    Need to explore other potential causes for her spells  Would not advise beta blocker med use, without clarification of tachycardia problem  Advised doing 24-hr urine for total metanephrines, start urine collection when new spell symptom starts   Discussed the 24-hr urine testing protocol  Message/call me if questions    Addressed patient questions today    Future labs ordered today:   Orders Placed This Encounter   Procedures     T4 free     TSH     Calcium     Vitamin D Deficiency     Metanephrine random or 24 hr urine     Radiology/Consults ordered today: None    More than 50% of the time spent with Ms. Mazariegos on counseling / coordinating her care.  Total appointment time was 25 minutes.    Follow-up:  6-8 weeks, nura Whittington MD  Endocrinology  [facility]  Cc:  REGINA Valdes

## 2018-04-06 NOTE — PATIENT INSTRUCTIONS
Try low dose levothyroxine 0.025 mg 1/2 tab dose 2-days per week (such as Mon + Friday)  Do a 24-hr urine collection for total metanephrines (adrenaline), when/if spell occurs  Contact Dr. ARRIAZA if questions  Sign up for the [employer] for easier messaging    See back for f/u evaluation in 6-8 weeks

## 2018-04-06 NOTE — Clinical Note
Rather strange case, unclear if her spells relate to thyroid or other hormone issues... May need to wear 24-hr aguirre vs cardiac event monitor.  TL

## 2018-04-06 NOTE — MR AVS SNAPSHOT
"              After Visit Summary   4/6/2018    Chance Mazariegos    MRN: [id number]           Patient Information     Date Of Birth          1980        Visit Information        Provider Department      4/6/2018 11:30 AM João Whittington MD [facility]        Today's Diagnoses     Hashimoto's thyroiditis    -  1    Vitamin D deficiency        Spell of dizziness          Care Instructions    Try low dose levothyroxine 0.025 mg 1/2 tab dose 2-days per week (such as Mon + Friday)  Do a 24-hr urine collection for total metanephrines (adrenaline), when/if spell occurs  Contact Dr. ARRIAZA if questions  Sign up for the [place] light for easier messaging    See back for f/u evaluation in 6-8 weeks          Follow-ups after your visit        Future tests that were ordered for you today     Open Future Orders        Priority Expected Expires Ordered    Metanephrine random or 24 hr urine Routine  4/6/2019 4/6/2018            Who to contact     If you have questions or need follow up information about today's clinic visit or your schedule please contact [facility] directly at [phone].  Normal or non-critical lab and imaging results will be communicated to you by [employer], letter or phone within 4 business days after the clinic has received the results. If you do not hear from us within 7 days, please contact the clinic through [employer] or phone. If you have a critical or abnormal lab result, we will notify you by phone as soon as possible.  Submit refill requests through light or call your pharmacy and they will forward the refill request to us. Please allow 3 business days for your refill to be completed.          Additional Information About Your Visit        [employer] Information     light lets you send messages to your doctor, view your test results, renew your prescriptions, schedule appointments and more. To sign up, go to [email or web address] . Click on \"Log in\" on the left side of the " "screen, which will take you to the Welcome page. Then click on \"Sign up Now\" on the right side of the page.     You will be asked to enter the access code listed below, as well as some personal information. Please follow the directions to create your username and password.     Your access code is: [email or web address]  Expires: 2018 12:35 PM     Your access code will  in 90 days. If you need help or a new code, please call your [facility] clinic or [phone].        Care EveryWhere ID     This is your Care EveryWhere ID. This could be used by other organizations to access your [facility] medical records  [id number]        Your Vitals Were     Pulse Height BMI (Body Mass Index)             73 5' 7\" (1.702 m) 36.34 kg/m2          Blood Pressure from Last 3 Encounters:   18 121/75   18 120/81   18 106/73    Weight from Last 3 Encounters:   18 232 lb (105.2 kg)   18 232 lb (105.2 kg)   18 232 lb 9.6 oz (105.5 kg)              We Performed the Following     Calcium     T4 free     TSH     Vitamin D Deficiency        Primary Care Provider Office Phone # Fax #    Jemima Valdes PA-C [phone] [address] [facility] [address]  [facility] [address]        Equal Access to Services     [facility]: Hadii aad ku hadasho Soluisali, waaxda luqadaha, qaybta kaalmada adeegyada, hortencia veronica . So [facility] [phone].    ATENCIÓN: Si habla español, tiene a dickerson disposición servicios gratuitos de asistencia lingüística. Llame al [phone].    We comply with applicable federal civil rights laws and [place] laws. We do not discriminate on the basis of race, color, national origin, age, disability, sex, sexual orientation, or gender identity.            Thank you!     Thank you for choosing [facility]  for your care. Our goal is always to provide you with excellent care. Hearing back from our patients is one way we can continue to improve our " services. Please take a few minutes to complete the written survey that you may receive in the mail after your visit with us. Thank you!             Your Updated Medication List - Protect others around you: Learn how to safely use, store and throw away your medicines at www.disposemymeds.org.          This list is accurate as of 4/6/18 12:09 PM.  Always use your most recent med list.                   Brand Name Dispense Instructions for use Diagnosis    levothyroxine 25 MCG tablet    SYNTHROID/LEVOTHROID    15 tablet    Take 1/2 tablet by mouth daily, as directed        vitamin D 19892 UNIT capsule    ERGOCALCIFEROL    8 capsule    Take one capsule by mouth twice per week, as directed    Vitamin D deficiency

## 2018-04-16 ENCOUNTER — OFFICE VISIT (OUTPATIENT)
Dept: FAMILY MEDICINE | Facility: CLINIC | Age: 38
End: 2018-04-16
Payer: COMMERCIAL

## 2018-04-16 VITALS
HEIGHT: 67 IN | WEIGHT: 235 LBS | BODY MASS INDEX: 36.88 KG/M2 | TEMPERATURE: 98.1 F | SYSTOLIC BLOOD PRESSURE: 128 MMHG | DIASTOLIC BLOOD PRESSURE: 74 MMHG | OXYGEN SATURATION: 97 % | HEART RATE: 106 BPM

## 2018-04-16 DIAGNOSIS — E55.9 VITAMIN D DEFICIENCY: ICD-10-CM

## 2018-04-16 DIAGNOSIS — R42 SPELL OF DIZZINESS: ICD-10-CM

## 2018-04-16 DIAGNOSIS — J98.01 POST-INFECTION BRONCHOSPASM: ICD-10-CM

## 2018-04-16 DIAGNOSIS — R00.0 TACHYCARDIA: Primary | ICD-10-CM

## 2018-04-16 DIAGNOSIS — E06.3 HASHIMOTO'S THYROIDITIS: ICD-10-CM

## 2018-04-16 PROCEDURE — 99214 OFFICE O/P EST MOD 30 MIN: CPT | Performed by: PHYSICIAN ASSISTANT

## 2018-04-16 PROCEDURE — 84439 ASSAY OF FREE THYROXINE: CPT | Performed by: INTERNAL MEDICINE

## 2018-04-16 PROCEDURE — 84443 ASSAY THYROID STIM HORMONE: CPT | Performed by: INTERNAL MEDICINE

## 2018-04-16 PROCEDURE — 36415 COLL VENOUS BLD VENIPUNCTURE: CPT | Performed by: INTERNAL MEDICINE

## 2018-04-16 PROCEDURE — 82310 ASSAY OF CALCIUM: CPT | Performed by: INTERNAL MEDICINE

## 2018-04-16 PROCEDURE — 82306 VITAMIN D 25 HYDROXY: CPT | Performed by: INTERNAL MEDICINE

## 2018-04-16 RX ORDER — FLUTICASONE PROPIONATE 44 UG/1
2 AEROSOL, METERED RESPIRATORY (INHALATION) 2 TIMES DAILY
Qty: 1 INHALER | Refills: 0 | Status: SHIPPED | OUTPATIENT
Start: 2018-04-16 | End: 2018-07-10

## 2018-04-16 RX ORDER — CODEINE PHOSPHATE AND GUAIFENESIN 10; 100 MG/5ML; MG/5ML
1 SOLUTION ORAL EVERY 4 HOURS PRN
Qty: 120 ML | Refills: 0 | Status: SHIPPED | OUTPATIENT
Start: 2018-04-16 | End: 2018-07-10

## 2018-04-16 NOTE — NURSING NOTE
"Chief Complaint   Patient presents with     RECHECK     thyroid       Initial /74 (BP Location: Right arm, Patient Position: Sitting, Cuff Size: Adult Regular)  Pulse 106  Temp 98.1  F (36.7  C) (Tympanic)  Ht 5' 7\" (1.702 m)  Wt 235 lb (106.6 kg)  SpO2 97%  Breastfeeding? No  BMI 36.81 kg/m2 Estimated body mass index is 36.81 kg/(m^2) as calculated from the following:    Height as of this encounter: 5' 7\" (1.702 m).    Weight as of this encounter: 235 lb (106.6 kg).  Medication Reconciliation: complete    Leif Bull CMA on 4/16/2018 at 2:25 PM    "

## 2018-04-16 NOTE — MR AVS SNAPSHOT
After Visit Summary   4/16/2018    Chance Mazariegos    MRN: [id number]           Patient Information     Date Of Birth          1980        Visit Information        Provider Department      4/16/2018 2:30 PM Jemima Valdes PA-C [facility]        Today's Diagnoses     Tachycardia    -  1    Hashimoto's thyroiditis        Post-infection bronchospasm           Follow-ups after your visit        Additional Services     CARDIO  ADULT REFERRAL       [facility] is referring you to Cardiology Services.       The  Representative will assist you in the coordination of your Cardiology care as prescribed by your physician.    The  Representative will call you within 24 hours to help schedule your appointment, or you may contact the  Representative at: [phone].         Type of Referral: New Cardiology Referral            Timeframe requested: 2 weeks       Coverage of these services is subject to the terms and limitations of your health insurance plan.  Please call member services at your health plan with any benefit or coverage questions.      If X-rays, CT or MRI's have been performed, please contact the facility where they were done to arrange for , prior to your scheduled appointment.  Please bring this referral request to your appointment and present it to your specialist.                  Future tests that were ordered for you today     Open Future Orders        Priority Expected Expires Ordered    Echocardiogram Complete Routine  4/16/2019 4/16/2018            Who to contact     If you have questions or need follow up information about today's clinic visit or your schedule please contact [facility] directly at [phone].  Normal or non-critical lab and imaging results will be communicated to you by MyChart, letter or phone within 4 business days after the clinic has received the results. If you do not hear from  "us within 7 days, please contact the clinic through [employer] or phone. If you have a critical or abnormal lab result, we will notify you by phone as soon as possible.  Submit refill requests through [employer] or call your pharmacy and they will forward the refill request to us. Please allow 3 business days for your refill to be completed.          Additional Information About Your Visit        [employer] Information     [employer] gives you secure access to your electronic health record. If you see a primary care provider, you can also send messages to your care team and make appointments. If you have questions, please call your primary care clinic.  If you do not have a primary care provider, please call [phone] and they will assist you.        Care EveryWhere ID     This is your Care EveryWhere ID. This could be used by other organizations to access your [facility] medical records  [id number]        Your Vitals Were     Pulse Temperature Height Pulse Oximetry Breastfeeding? BMI (Body Mass Index)    106 98.1  F (36.7  C) (Tympanic) 5' 7\" (1.702 m) 97% No 36.81 kg/m2       Blood Pressure from Last 3 Encounters:   04/16/18 128/74   04/06/18 121/75   02/20/18 120/81    Weight from Last 3 Encounters:   04/16/18 235 lb (106.6 kg)   04/06/18 232 lb (105.2 kg)   02/20/18 232 lb (105.2 kg)              We Performed the Following     CARDIO  ADULT REFERRAL          Today's Medication Changes          These changes are accurate as of 4/16/18  2:53 PM.  If you have any questions, ask your nurse or doctor.               Start taking these medicines.        Dose/Directions    fluticasone 44 MCG/ACT Inhaler   Commonly known as:  FLOVENT HFA   Used for:  Post-infection bronchospasm   Started by:  Jemima Valdes PA-C        Dose:  2 puff   Inhale 2 puffs into the lungs 2 times daily   Quantity:  1 Inhaler   Refills:  0            Where to get your medicines      These medications were sent to [facility] [id number] - " [place], [place] - [address] AT [facility] [address]  [address], [facility] [address]     Phone:  [phone]     fluticasone 44 MCG/ACT Inhaler                Primary Care Provider Office Phone # Fax #    Jemima Valdes PA-C [phone] [address]  [facility] [address]        Equal Access to Services     [facility] : Hadii aad ku hadasho Soomaali, waaxda luqadaha, qaybta kaalmada adeegyada, waxay idiin hayaan adeeg kharash la'sun ah. So [facility] [phone].    ATENCIÓN: Si habla español, tiene a dickerson disposición servicios gratuitos de asistencia lingüística. [facility] [phone].    We comply with applicable federal civil rights laws and [place] laws. We do not discriminate on the basis of race, color, national origin, age, disability, sex, sexual orientation, or gender identity.            Thank you!     Thank you for choosing [facility]  for your care. Our goal is always to provide you with excellent care. Hearing back from our patients is one way we can continue to improve our services. Please take a few minutes to complete the written survey that you may receive in the mail after your visit with us. Thank you!             Your Updated Medication List - Protect others around you: Learn how to safely use, store and throw away your medicines at www.disposemymeds.org.          This list is accurate as of 4/16/18  2:53 PM.  Always use your most recent med list.                   Brand Name Dispense Instructions for use Diagnosis    fluticasone 44 MCG/ACT Inhaler    FLOVENT HFA    1 Inhaler    Inhale 2 puffs into the lungs 2 times daily    Post-infection bronchospasm       levothyroxine 25 MCG tablet    SYNTHROID/LEVOTHROID    15 tablet    Take 1/2 tablet by mouth daily, as directed        vitamin D 19929 UNIT capsule    ERGOCALCIFEROL    8 capsule    Take one capsule by mouth twice per week, as directed    Vitamin D deficiency

## 2018-04-16 NOTE — PROGRESS NOTES
HPI: 38 yo female here for f/u hypothyroidism due to Hashimotos.  She works for a [employer] in [place] and will be quitting that since needs more time for her [employer] business.  That may affect her insurance.  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    Since her teenage years would have presyncopal episodes but never fully faint  She feels bad the rest of the day when this happens  Hunger can trigger this, fear can trigger this  She was in the ED end of Jan in [place] and heart rate was in the 140s  She doesn't believe she has ever had an echocardiogram  She can feel clammy  Currently she is not taking any synthroid    Also has a cough  Had bad cold starting 3 weeks ago  Worse when supine.  Feels better, but cough is persisting which sometimes happens for her.    Past Medical History:   Diagnosis Date     Hashimoto's thyroiditis      Past Surgical History:   Procedure Laterality Date     intestinal malrotation surgery 2011 2011    Exploratory Laparotomy     SALPINGECTOMY  2011    Performed at the time of her exploratory laparotomy     Social History   Substance Use Topics     Smoking status: Never Smoker     Smokeless tobacco: Never Used     Alcohol use No     Current Outpatient Prescriptions   Medication Sig Dispense Refill     fluticasone (FLOVENT HFA) 44 MCG/ACT Inhaler Inhale 2 puffs into the lungs 2 times daily 1 Inhaler 0     guaiFENesin-codeine (ROBITUSSIN AC) 100-10 MG/5ML SOLN solution Take 5 mLs by mouth every 4 hours as needed for cough 120 mL 0     levothyroxine (SYNTHROID/LEVOTHROID) 25 MCG tablet Take 1/2 tablet by mouth daily, as directed 15 tablet 5     vitamin D (ERGOCALCIFEROL) 05293 UNIT capsule Take one capsule by mouth twice per week, as directed 8  "capsule 3     Allergies   Allergen Reactions     Penicillins      FAMILY HISTORY NOTED AND REVIEWED    PHYSICAL EXAM:    /74 (BP Location: Right arm, Patient Position: Sitting, Cuff Size: Adult Regular)  Pulse 106  Temp 98.1  F (36.7  C) (Tympanic)  Ht 5' 7\" (1.702 m)  Wt 235 lb (106.6 kg)  SpO2 97%  Breastfeeding? No  BMI 36.81 kg/m2    Patient appears non toxic  Lungs: CTA bilat. No rhonchi, crackles or wheezing.  Heart; RRR without m/r/g    Assessment and Plan:     (R00.0) Tachycardia  (primary encounter diagnosis)  Comment: she feels this a side effect from taking any kind of thyroid medication. Recd she proceed with the blood tests ordered by Dr. Hines and get echocardiogram. Had EKG in Jan in ED and recd she get a copy of that for me and for appt with cardiology  Plan: Echocardiogram Complete, CARDIO  ADULT        REFERRAL            (E06.3) Hashimoto's thyroiditis  Comment:   Plan: per Dr. Hines    (J98.01) Post-infection bronchospasm  Comment:   Plan: fluticasone (FLOVENT HFA) 44 MCG/ACT Inhaler,         guaiFENesin-codeine (ROBITUSSIN AC) 100-10         MG/5ML SOLN solution              Jemima Valdes PA-C            "

## 2018-04-17 LAB — DEPRECATED CALCIDIOL+CALCIFEROL SERPL-MC: 18 UG/L (ref 20–75)

## 2018-04-18 LAB
CALCIUM SERPL-MCNC: 9.1 MG/DL (ref 8.5–10.1)
T4 FREE SERPL-MCNC: 0.75 NG/DL (ref 0.76–1.46)
TSH SERPL DL<=0.005 MIU/L-ACNC: 3.21 MU/L (ref 0.4–4)

## 2018-05-01 ENCOUNTER — HOSPITAL ENCOUNTER (OUTPATIENT)
Dept: CARDIOLOGY | Facility: CLINIC | Age: 38
Discharge: HOME | End: 2018-05-01
Attending: PHYSICIAN ASSISTANT | Admitting: PHYSICIAN ASSISTANT
Payer: COMMERCIAL

## 2018-05-01 DIAGNOSIS — R00.0 TACHYCARDIA: ICD-10-CM

## 2018-05-01 PROCEDURE — 25500064 ZZH RX 255 OP 636: Performed by: PHYSICIAN ASSISTANT

## 2018-05-01 PROCEDURE — 93306 TTE W/DOPPLER COMPLETE: CPT

## 2018-05-01 PROCEDURE — 93306 TTE W/DOPPLER COMPLETE: CPT | Mod: 26 | Performed by: INTERNAL MEDICINE

## 2018-05-01 RX ADMIN — HUMAN ALBUMIN MICROSPHERES AND PERFLUTREN 2 ML: 10; .22 INJECTION, SOLUTION INTRAVENOUS at 14:31

## 2018-05-03 ENCOUNTER — OFFICE VISIT (OUTPATIENT)
Dept: CARDIOLOGY | Facility: CLINIC | Age: 38
End: 2018-05-03
Attending: PHYSICIAN ASSISTANT
Payer: COMMERCIAL

## 2018-05-03 VITALS
HEART RATE: 72 BPM | SYSTOLIC BLOOD PRESSURE: 120 MMHG | DIASTOLIC BLOOD PRESSURE: 82 MMHG | BODY MASS INDEX: 36.41 KG/M2 | WEIGHT: 232 LBS | HEIGHT: 67 IN

## 2018-05-03 DIAGNOSIS — E06.3 HASHIMOTO'S THYROIDITIS: ICD-10-CM

## 2018-05-03 DIAGNOSIS — R00.2 PALPITATIONS: Primary | ICD-10-CM

## 2018-05-03 PROCEDURE — 99203 OFFICE O/P NEW LOW 30 MIN: CPT | Mod: 25 | Performed by: INTERNAL MEDICINE

## 2018-05-03 PROCEDURE — 93000 ELECTROCARDIOGRAM COMPLETE: CPT | Performed by: INTERNAL MEDICINE

## 2018-05-03 NOTE — MR AVS SNAPSHOT
After Visit Summary   5/3/2018    Chance Mazariegos    MRN: [id number]           Patient Information     Date Of Birth          1980        Visit Information        Provider Department      5/3/2018 8:15 AM Yung Jewell MD [facility]        Today's Diagnoses     Palpitations    -  1       Follow-ups after your visit        Future tests that were ordered for you today     Open Future Orders        Priority Expected Expires Ordered    Zio Patch Monitor Routine 8/1/2018 5/3/2019 5/3/2018            Who to contact     If you have questions or need follow up information about today's clinic visit or your schedule please contact [facility] directly at [phone].  Normal or non-critical lab and imaging results will be communicated to you by MyChart, letter or phone within 4 business days after the clinic has received the results. If you do not hear from us within 7 days, please contact the clinic through JamLegendhart or phone. If you have a critical or abnormal lab result, we will notify you by phone as soon as possible.  Submit refill requests through bizsol or call your pharmacy and they will forward the refill request to us. Please allow 3 business days for your refill to be completed.          Additional Information About Your Visit        MyChart Information     bizsol gives you secure access to your electronic health record. If you see a primary care provider, you can also send messages to your care team and make appointments. If you have questions, please call your primary care clinic.  If you do not have a primary care provider, please call [phone] and they will assist you.        Care EveryWhere ID     This is your Care EveryWhere ID. This could be used by other organizations to access your [place] medical records  [id number]        Your Vitals Were     Pulse Height BMI (Body Mass Index)             73  "1.702 m (5' 7\") 36.34 kg/m2          Blood Pressure from Last 3 Encounters:   05/03/18 120/82   04/16/18 128/74   04/06/18 121/75    Weight from Last 3 Encounters:   05/03/18 105.2 kg (232 lb)   04/16/18 106.6 kg (235 lb)   04/06/18 105.2 kg (232 lb)              We Performed the Following     EKG 12-lead complete w/read - Clinics (performed today)        Primary Care Provider Office Phone # Fax #    Jemima Valdes PA-C [phone] [address] [facility] [address]  [facility] [address]        Equal Access to Services     LUCY GLASER : Hadii leatha toneyo Soelizabeth, waaxda luqadaha, qaybta kaalmada adehopeyada, hortencia veronica . So [facility] [phone].    ATENCIÓN: Si habla español, tiene a dickerson disposición servicios gratuitos de asistencia lingüística. [facility] [phone].    We comply with applicable federal civil rights laws and [place] laws. We do not discriminate on the basis of race, color, national origin, age, disability, sex, sexual orientation, or gender identity.            Thank you!     Thank you for choosing [facility]  for your care. Our goal is always to provide you with excellent care. Hearing back from our patients is one way we can continue to improve our services. Please take a few minutes to complete the written survey that you may receive in the mail after your visit with us. Thank you!             Your Updated Medication List - Protect others around you: Learn how to safely use, store and throw away your medicines at www.disposemymeds.org.          This list is accurate as of 5/3/18  9:05 AM.  Always use your most recent med list.                   Brand Name Dispense Instructions for use Diagnosis    fluticasone 44 MCG/ACT Inhaler    FLOVENT HFA    1 Inhaler    Inhale 2 puffs into the lungs 2 times daily    Post-infection bronchospasm       guaiFENesin-codeine 100-10 MG/5ML Soln solution    ROBITUSSIN AC    120 mL    Take " 5 mLs by mouth every 4 hours as needed for cough    Post-infection bronchospasm       levothyroxine 25 MCG tablet    SYNTHROID/LEVOTHROID    15 tablet    Take 1/2 tablet by mouth daily, as directed        vitamin D 40272 UNIT capsule    ERGOCALCIFEROL    8 capsule    Take one capsule by mouth twice per week, as directed    Vitamin D deficiency

## 2018-05-03 NOTE — PROGRESS NOTES
HPI and Plan:   See dictation:[id number]    Orders Placed This Encounter   Procedures     EKG 12-lead complete w/read - Clinics (performed today)     Zio Patch Monitor       No orders of the defined types were placed in this encounter.      There are no discontinued medications.      Encounter Diagnosis   Name Primary?     Palpitations Yes       CURRENT MEDICATIONS:  Current Outpatient Prescriptions   Medication Sig Dispense Refill     fluticasone (FLOVENT HFA) 44 MCG/ACT Inhaler Inhale 2 puffs into the lungs 2 times daily (Patient not taking: Reported on 5/3/2018) 1 Inhaler 0     guaiFENesin-codeine (ROBITUSSIN AC) 100-10 MG/5ML SOLN solution Take 5 mLs by mouth every 4 hours as needed for cough (Patient not taking: Reported on 5/3/2018) 120 mL 0     levothyroxine (SYNTHROID/LEVOTHROID) 25 MCG tablet Take 1/2 tablet by mouth daily, as directed (Patient not taking: Reported on 5/3/2018) 15 tablet 5     vitamin D (ERGOCALCIFEROL) 40343 UNIT capsule Take one capsule by mouth twice per week, as directed 8 capsule 3       ALLERGIES     Allergies   Allergen Reactions     Penicillins        PAST MEDICAL HISTORY:  Past Medical History:   Diagnosis Date     Hashimoto's thyroiditis      Spell of dizziness     dizziness, tachycardia, etc     Vitamin D deficiency        PAST SURGICAL HISTORY:  Past Surgical History:   Procedure Laterality Date     intestinal malrotation surgery 2011 2011    Exploratory Laparotomy     SALPINGECTOMY  2011    Performed at the time of her exploratory laparotomy       FAMILY HISTORY:  Family History   Problem Relation Age of Onset     Neuropathy Mother      Thyroid Disease Mother      CANCER Father      throat cancer     CEREBROVASCULAR DISEASE Maternal Grandmother      Thyroid Disease Maternal Grandmother      Coronary Artery Disease Maternal Grandfather      MI       SOCIAL HISTORY:  Social History     Social History     Marital status:      Spouse name: N/A     Number of children: N/A      "Years of education: N/A     Social History Main Topics     Smoking status: Never Smoker     Smokeless tobacco: Never Used     Alcohol use Yes      Comment: rare     Drug use: No     Sexual activity: Yes     Partners: Female     Birth control/ protection: None     Other Topics Concern     Parent/Sibling W/ Cabg, Mi Or Angioplasty Before 65f 55m? No     Social History Narrative    Same Sex relationship. Partner is Beatrice. Moved to [place] from [place] after inheriting a house from Beatrice's relative. Looking into adoption. Beatrice had endometrial hyperplasia after miscarriage with Chime's egg.     Denies intimate partner violence.       Review of Systems:  Skin:  Negative       Eyes:  Positive for glasses    ENT:  Negative      Respiratory:  Negative       Cardiovascular:    Positive for;palpitations;dizziness    Gastroenterology: Negative      Genitourinary:  not assessed      Musculoskeletal:  Negative      Neurologic:  Negative      Psychiatric:  Negative      Heme/Lymph/Imm:  Negative      Endocrine:  Positive for thyroid disorder      Physical Exam:  Vitals: /82  Pulse 72  Ht 1.702 m (5' 7\")  Wt 105.2 kg (232 lb)  BMI 36.34 kg/m2    Constitutional:  cooperative, alert and oriented, well developed, well nourished, in no acute distress        Skin:  warm and dry to the touch, no apparent skin lesions or masses noted          Head:  normocephalic, no masses or lesions        Eyes:  pupils equal and round, conjunctivae and lids unremarkable, sclera white, no xanthalasma, EOMS intact, no nystagmus        Lymph:      ENT:  no pallor or cyanosis, dentition good        Neck:  carotid pulses are full and equal bilaterally, JVP normal, no carotid bruit        Respiratory:  normal breath sounds, clear to auscultation, normal A-P diameter, normal symmetry, normal respiratory excursion, no use of accessory muscles         Cardiac: regular rhythm, normal S1/S2, no S3 or S4, apical impulse not displaced, no " murmurs, gallops or rubs                pulses full and equal, no bruits auscultated                                        GI:  abdomen soft, non-tender, BS normoactive, no mass, no HSM, no bruits        Extremities and Muscular Skeletal:  no deformities, clubbing, cyanosis, erythema observed;no edema              Neurological:  no gross motor deficits;affect appropriate        Psych:  Alert and Oriented x 3        CC  Jemima Valdes, PAMikaylaC  [address]  [place] [address]

## 2018-05-03 NOTE — LETTER
5/3/2018    Jemima Valdes PA-C  [address]  [place] [address]    RE: Chance Mazariegos       Dear Colleague,    I had the pleasure of seeing Chance Mazariegos in the [facility].    HPI and Plan:   See dictation:[id number]    Orders Placed This Encounter   Procedures     EKG 12-lead complete w/read - Clinics (performed today)     Zio Patch Monitor       No orders of the defined types were placed in this encounter.      There are no discontinued medications.      Encounter Diagnosis   Name Primary?     Palpitations Yes       CURRENT MEDICATIONS:  Current Outpatient Prescriptions   Medication Sig Dispense Refill     fluticasone (FLOVENT HFA) 44 MCG/ACT Inhaler Inhale 2 puffs into the lungs 2 times daily (Patient not taking: Reported on 5/3/2018) 1 Inhaler 0     guaiFENesin-codeine (ROBITUSSIN AC) 100-10 MG/5ML SOLN solution Take 5 mLs by mouth every 4 hours as needed for cough (Patient not taking: Reported on 5/3/2018) 120 mL 0     levothyroxine (SYNTHROID/LEVOTHROID) 25 MCG tablet Take 1/2 tablet by mouth daily, as directed (Patient not taking: Reported on 5/3/2018) 15 tablet 5     vitamin D (ERGOCALCIFEROL) 57569 UNIT capsule Take one capsule by mouth twice per week, as directed 8 capsule 3       ALLERGIES     Allergies   Allergen Reactions     Penicillins        PAST MEDICAL HISTORY:  Past Medical History:   Diagnosis Date     Hashimoto's thyroiditis      Spell of dizziness     dizziness, tachycardia, etc     Vitamin D deficiency        PAST SURGICAL HISTORY:  Past Surgical History:   Procedure Laterality Date     intestinal malrotation surgery 2011 2011    Exploratory Laparotomy     SALPINGECTOMY  2011    Performed at the time of her exploratory laparotomy       FAMILY HISTORY:  Family History   Problem Relation Age of Onset     Neuropathy Mother      Thyroid Disease Mother      CANCER Father      throat cancer     CEREBROVASCULAR DISEASE Maternal Grandmother      Thyroid  "Disease Maternal Grandmother      Coronary Artery Disease Maternal Grandfather      MI       SOCIAL HISTORY:  Social History     Social History     Marital status:      Spouse name: N/A     Number of children: N/A     Years of education: N/A     Social History Main Topics     Smoking status: Never Smoker     Smokeless tobacco: Never Used     Alcohol use Yes      Comment: rare     Drug use: No     Sexual activity: Yes     Partners: Female     Birth control/ protection: None     Other Topics Concern     Parent/Sibling W/ Cabg, Mi Or Angioplasty Before 65f 55m? No     Social History Narrative    Same Sex relationship. Partner is Beatrice. Moved to [place] from [place] after inheriting a house from Beatrice's relative. Looking into adoption. Beatrice had endometrial hyperplasia after miscarriage with Johne's egg.     Denies intimate partner violence.       Review of Systems:  Skin:  Negative       Eyes:  Positive for glasses    ENT:  Negative      Respiratory:  Negative       Cardiovascular:    Positive for;palpitations;dizziness    Gastroenterology: Negative      Genitourinary:  not assessed      Musculoskeletal:  Negative      Neurologic:  Negative      Psychiatric:  Negative      Heme/Lymph/Imm:  Negative      Endocrine:  Positive for thyroid disorder      Physical Exam:  Vitals: /82  Pulse 72  Ht 1.702 m (5' 7\")  Wt 105.2 kg (232 lb)  BMI 36.34 kg/m2    Constitutional:  cooperative, alert and oriented, well developed, well nourished, in no acute distress        Skin:  warm and dry to the touch, no apparent skin lesions or masses noted          Head:  normocephalic, no masses or lesions        Eyes:  pupils equal and round, conjunctivae and lids unremarkable, sclera white, no xanthalasma, EOMS intact, no nystagmus        Lymph:      ENT:  no pallor or cyanosis, dentition good        Neck:  carotid pulses are full and equal bilaterally, JVP normal, no carotid bruit        Respiratory:  normal breath " sounds, clear to auscultation, normal A-P diameter, normal symmetry, normal respiratory excursion, no use of accessory muscles         Cardiac: regular rhythm, normal S1/S2, no S3 or S4, apical impulse not displaced, no murmurs, gallops or rubs                pulses full and equal, no bruits auscultated                                        GI:  abdomen soft, non-tender, BS normoactive, no mass, no HSM, no bruits        Extremities and Muscular Skeletal:  no deformities, clubbing, cyanosis, erythema observed;no edema              Neurological:  no gross motor deficits;affect appropriate        Psych:  Alert and Oriented x 3        CC  Jemima Valdes PA-C  [address]  [place] [address]                Thank you for allowing me to participate in the care of your patient.      Sincerely,     Yung Jewell MD     [facility]    cc:   Jemima Valdes PA-C  [address]  [place] [address]

## 2018-05-03 NOTE — PROGRESS NOTES
Chance,    Your echocardiogram looks fine.  You can get more in depth details when you see the cardiologist today.    Jemima Valdes PA-C

## 2018-05-03 NOTE — PROGRESS NOTES
Service Date: 2018      [place] [address]      RE: Chance Mazariegos    MRN: [id number]   : 1980      Dear Jemima:       I had the pleasure of seeing your patient Chance Mazariegos at the [facility] for evaluation of palpitations.  Chance Mazariegos is a delightful, 37-year-old female  who presents for palpitations and tachycardia.  The patient states that she has been diagnosed with Hashimoto thyroiditis.  She has been on low-dose thyroid supplementation multiple times, but shortly after starting this, she will develop a feeling of clamminess, jittery, lightheadedness and heart racing.  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  When she stops her thyroid supplementation, her symptoms resolve.  Her tachycardia and clamminess can last up to 72 hours.  Her heart rate generally starts in the 70s to 80 beats per minute, increases to 90 and then will accelerate to 130 beats a minute, making her lightheaded.  When she gets to the ER, generally there have been no issues, and I did review an EKG from 2016, which showed normal sinus rhythm and no abnormalities.  Her last episode occurred in February of this year, again seeking attention in the emergency room.  She has some lightheadedness during these events.  She has not fainted.  She believes she may have a history of vasovagal issues.  Since her teen years, she has had episodes with nausea and near-syncope, but tachycardia, not bradycardia.  The patient does not notice any significant flushing during these events.  She does not notice significant headaches.  She has tried different formulations of her thyroid medications.  She has gone down to even once or twice per week with symptoms.  She works as a  predominantly in [place] and is back and forth between the [facility].  She  has not sought medical attention in the [place].  She has known low vitamin D levels and is on supplementation.  An EKG today demonstrates normal sinus rhythm and is a normal EKG.  An echocardiogram on  was normal without valvular abnormalities.  Normal left ventricular function and normal sinus rhythm at a rate of 80 beats per minute.  There was 1+ pulmonic insufficiency.  The patient exercises generally with a  2 times a week starting 3 months ago.  She swims or does cardio training once to twice per week.      PAST MEDICAL HISTORY:  Intestinal malrotation repair.  No other hospitalizations or surgery.      PHYSICAL EXAMINATION:  As listed below.      ASSESSMENT:  Noreen Soares is a delightful, 37-year-old female with episodes of tachycardia.  Etiology is unclear.  She has no structural abnormalities of her heart.  She is able to exercise with dyspnea on exertion, but otherwise no problems.  She does not describe a significant supraventricular tachycardia.  The length of these episodes up to 72 hours does not suggest an adrenal cause, such as pheochromocytoma.  Some of this may be anxiety provoked.      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  She notes that it takes about 30 days before she starts to have her sensations, so we will place a 2 week Ziopatch on .  We will then monitor her rhythms for the next 2 weeks.  The patient will call for any other episodes before that or after that.      It is my pleasure to assist in the care of Noreen Soares.  All her questions were answered to her satisfaction.      Sincerely,      Vesta Jewell MD, FACC         VESTA JEWELL MD, FACC             D: 2018   T: 2018   MT: nancy      Name:     NOREEN SOARES   MRN:      [id number]        Account:      [id number]   :      1980           Service Date: 2018       Document: [id number]

## 2018-07-10 ENCOUNTER — OFFICE VISIT (OUTPATIENT)
Dept: FAMILY MEDICINE | Facility: CLINIC | Age: 38
End: 2018-07-10
Payer: COMMERCIAL

## 2018-07-10 VITALS
WEIGHT: 231 LBS | HEIGHT: 67 IN | SYSTOLIC BLOOD PRESSURE: 97 MMHG | DIASTOLIC BLOOD PRESSURE: 59 MMHG | OXYGEN SATURATION: 97 % | HEART RATE: 83 BPM | TEMPERATURE: 98.5 F | BODY MASS INDEX: 36.26 KG/M2

## 2018-07-10 DIAGNOSIS — R22.1 NECK SWELLING: ICD-10-CM

## 2018-07-10 DIAGNOSIS — E06.3 HASHIMOTO'S THYROIDITIS: ICD-10-CM

## 2018-07-10 DIAGNOSIS — E55.9 VITAMIN D DEFICIENCY: ICD-10-CM

## 2018-07-10 DIAGNOSIS — R53.83 OTHER FATIGUE: Primary | ICD-10-CM

## 2018-07-10 DIAGNOSIS — E66.9 OBESITY, UNSPECIFIED CLASSIFICATION, UNSPECIFIED OBESITY TYPE, UNSPECIFIED WHETHER SERIOUS COMORBIDITY PRESENT: ICD-10-CM

## 2018-07-10 LAB
ERYTHROCYTE [DISTWIDTH] IN BLOOD BY AUTOMATED COUNT: 13.7 % (ref 10–15)
HCT VFR BLD AUTO: 38.3 % (ref 35–47)
HGB BLD-MCNC: 12.6 G/DL (ref 11.7–15.7)
MCH RBC QN AUTO: 28.8 PG (ref 26.5–33)
MCHC RBC AUTO-ENTMCNC: 32.9 G/DL (ref 31.5–36.5)
MCV RBC AUTO: 87 FL (ref 78–100)
PLATELET # BLD AUTO: 343 10E9/L (ref 150–450)
RBC # BLD AUTO: 4.38 10E12/L (ref 3.8–5.2)
T3 SERPL-MCNC: 127 NG/DL (ref 60–181)
WBC # BLD AUTO: 10 10E9/L (ref 4–11)

## 2018-07-10 PROCEDURE — 99214 OFFICE O/P EST MOD 30 MIN: CPT | Performed by: PHYSICIAN ASSISTANT

## 2018-07-10 PROCEDURE — 36415 COLL VENOUS BLD VENIPUNCTURE: CPT | Performed by: PHYSICIAN ASSISTANT

## 2018-07-10 PROCEDURE — 84480 ASSAY TRIIODOTHYRONINE (T3): CPT | Performed by: PHYSICIAN ASSISTANT

## 2018-07-10 PROCEDURE — 84443 ASSAY THYROID STIM HORMONE: CPT | Performed by: PHYSICIAN ASSISTANT

## 2018-07-10 PROCEDURE — 85027 COMPLETE CBC AUTOMATED: CPT | Performed by: PHYSICIAN ASSISTANT

## 2018-07-10 PROCEDURE — 82306 VITAMIN D 25 HYDROXY: CPT | Performed by: PHYSICIAN ASSISTANT

## 2018-07-10 NOTE — MR AVS SNAPSHOT
After Visit Summary   7/10/2018    Chance Mazariegos    MRN: [id number]           Patient Information     Date Of Birth          1980        Visit Information        Provider Department      7/10/2018 2:00 PM Jemima Valdes PA-C [facility]        Today's Diagnoses     Other fatigue    -  1    Neck swelling        Hashimoto's thyroiditis        Vitamin D deficiency           Follow-ups after your visit        Your next 10 appointments already scheduled     Aug 01, 2018  9:00 AM CDT   ZIOPATCH MONITOR with DEYA   [facility] ([facility])    [address]  [place] [address]   [phone]              Future tests that were ordered for you today     Open Future Orders        Priority Expected Expires Ordered    US Thyroid Routine  7/10/2019 7/10/2018            Who to contact     If you have questions or need follow up information about today's clinic visit or your schedule please contact [facility] directly at [phone].  Normal or non-critical lab and imaging results will be communicated to you by payeverhart, letter or phone within 4 business days after the clinic has received the results. If you do not hear from us within 7 days, please contact the clinic through [employer] or phone. If you have a critical or abnormal lab result, we will notify you by phone as soon as possible.  Submit refill requests through [employer] or call your pharmacy and they will forward the refill request to us. Please allow 3 business days for your refill to be completed.          Additional Information About Your Visit        payeverharEnprise Solutions Information     [employer] gives you secure access to your electronic health record. If you see a primary care provider, you can also send messages to your care team and make appointments. If you have questions, please call your primary care clinic.  If you do not have a primary care provider, please call  "[phone] and they will assist you.        Care EveryWhere ID     This is your Care EveryWhere ID. This could be used by other organizations to access your [place] medical records  [id number]        Your Vitals Were     Pulse Temperature Height Last Period Pulse Oximetry BMI (Body Mass Index)    83 98.5  F (36.9  C) (Oral) 5' 7\" (1.702 m) 06/24/2018 (Approximate) 97% 36.18 kg/m2       Blood Pressure from Last 3 Encounters:   07/10/18 97/59   05/03/18 120/82   04/16/18 128/74    Weight from Last 3 Encounters:   07/10/18 231 lb (104.8 kg)   05/03/18 232 lb (105.2 kg)   04/16/18 235 lb (106.6 kg)              We Performed the Following     CBC with platelets     T3, total     TSH with free T4 reflex     Vitamin D Deficiency        Primary Care Provider Office Phone # Fax #    Jemima Valdes PA-C [phone] [address] [address]        Equal Access to Services     [facility]: Hadii leatha ku hadsophie Soelizabeth, waaxda lucarlton, qaybta kaalcee nguyen, hortencia veronica . So [facility] [phone].    ATENCIÓN: Si habla español, tiene a dickerson disposición servicios gratuitos de asistencia lingüística. [facility] [phone].    We comply with applicable federal civil rights laws and [place] laws. We do not discriminate on the basis of race, color, national origin, age, disability, sex, sexual orientation, or gender identity.            Thank you!     Thank you for choosing [facility]  for your care. Our goal is always to provide you with excellent care. Hearing back from our patients is one way we can continue to improve our services. Please take a few minutes to complete the written survey that you may receive in the mail after your visit with us. Thank you!             Your Updated Medication List - Protect others around you: Learn how to safely use, store and throw away your medicines at www.disposemymeds.org.          This list is accurate " as of 7/10/18  2:28 PM.  Always use your most recent med list.                   Brand Name Dispense Instructions for use Diagnosis    vitamin D 01150 UNIT capsule    ERGOCALCIFEROL    8 capsule    Take one capsule by mouth twice per week, as directed    Vitamin D deficiency

## 2018-07-10 NOTE — PROGRESS NOTES
"HPI: Chance is a 36 yo female here not feeling well. (fatigue, lack of focus)  She recently returned from a trip to [place] and [place]  Returned 6/27/18  Has not felt well since her return  She feels like her neck is swollen with some lymph node tenderness.  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 Hines and cardiology (Dr. Jewell)  She doesn't want to go back on levothyroxine so Dr. Jewell only wanted to have her do that if back on the levothyroxine.  She was doing photography in the woods a few weeks ago and was exposed to ticks, but no known tick bites.  She had some L ankle and foot swelling from travel, but that resolved.  She is also frustrated by her inability to lose weight despite going to the gym and not changing her diet.    Past Medical History:   Diagnosis Date     Hashimoto's thyroiditis      Spell of dizziness     dizziness, tachycardia, etc     Vitamin D deficiency      Past Surgical History:   Procedure Laterality Date     intestinal malrotation surgery 2011 2011    Exploratory Laparotomy     SALPINGECTOMY  2011    Performed at the time of her exploratory laparotomy     Social History   Substance Use Topics     Smoking status: Never Smoker     Smokeless tobacco: Never Used     Alcohol use Yes      Comment: rare     Current Outpatient Prescriptions   Medication Sig Dispense Refill     vitamin D (ERGOCALCIFEROL) 54907 UNIT capsule Take one capsule by mouth twice per week, as directed 8 capsule 3     Allergies   Allergen Reactions     Penicillins      FAMILY HISTORY NOTED AND REVIEWED    PHYSICAL EXAM:    BP 97/59 (BP Location: Right arm, Cuff Size: Adult Large)  Pulse 83  Temp 98.5  F (36.9  C) (Oral)  Ht 5' 7\" (1.702 m)  Wt 231 lb (104.8 kg)  LMP 06/24/2018 (Approximate)  SpO2 97%  BMI 36.18 kg/m2    EXAM:  GENERAL APPEARANCE: healthy, alert and no distress  EYES: Eyes grossly normal to inspection, PERRL and conjunctivae and sclerae normal  HENT: ear canals " and TM's normal and nose and mouth without ulcers or lesions  NECK: no adenopathy, no asymmetry, masses, or scars and thyroid normal to palpation  RESP: lungs clear to auscultation - no rales, rhonchi or wheezes  CV: regular rates and rhythm, normal S1 S2, no S3 or S4 and no murmur, click or rub  SKIN: no suspicious lesions or rashes    Assessment and Plan:     (R53.83) Other fatigue  (primary encounter diagnosis)  Comment:   Plan: CBC with platelets            (R22.1) Neck swelling  Comment:   Plan: US Thyroid            (E06.3) Hashimoto's thyroiditis  Comment:   Plan: TSH with free T4 reflex, T3, total, US Thyroid            (E55.9) Vitamin D deficiency  Comment:   Plan: Vitamin D level            (E66.9) Obesity, unspecified classification, unspecified obesity type, unspecified whether serious comorbidity present  Comment:   Plan: NUTRITION REFERRAL              Jemima Valdes PA-C

## 2018-07-11 LAB
DEPRECATED CALCIDIOL+CALCIFEROL SERPL-MC: 18 UG/L (ref 20–75)
TSH SERPL DL<=0.005 MIU/L-ACNC: 3.48 MU/L (ref 0.4–4)

## 2018-07-11 NOTE — PROGRESS NOTES
Johne,    Your vitamin D level is still low at 18.  Double your dose of vitamin D3 to 10,000U/day for one month and then go back to 5000U/day. Take with food as this vitamin is fat soluble.    Your thyroid tests were in normal range.  Your white blood cell count and hemoglobin are normal.    Let me know if you have any questions.    Jemima Valdes PA-C

## 2018-07-25 ENCOUNTER — HOSPITAL ENCOUNTER (OUTPATIENT)
Dept: ULTRASOUND IMAGING | Facility: CLINIC | Age: 38
Discharge: HOME | End: 2018-07-25
Attending: PHYSICIAN ASSISTANT | Admitting: PHYSICIAN ASSISTANT
Payer: COMMERCIAL

## 2018-07-25 DIAGNOSIS — E06.3 HASHIMOTO'S THYROIDITIS: ICD-10-CM

## 2018-07-25 DIAGNOSIS — R22.1 NECK SWELLING: ICD-10-CM

## 2018-07-25 PROCEDURE — 76536 US EXAM OF HEAD AND NECK: CPT

## 2018-07-26 NOTE — PROGRESS NOTES
Chance,    Your thyroid ultrasound does not show any nodules.  It is somewhat enlarged due to the hashimotos.    Jemima Valdes PA-C

## 2018-08-13 ENCOUNTER — OFFICE VISIT (OUTPATIENT)
Dept: SURGERY | Facility: CLINIC | Age: 38
End: 2018-08-13
Payer: COMMERCIAL

## 2018-08-13 VITALS
BODY MASS INDEX: 36.26 KG/M2 | DIASTOLIC BLOOD PRESSURE: 66 MMHG | HEIGHT: 67 IN | HEART RATE: 77 BPM | WEIGHT: 231 LBS | SYSTOLIC BLOOD PRESSURE: 92 MMHG

## 2018-08-13 DIAGNOSIS — R10.84 ABDOMINAL PAIN, GENERALIZED: ICD-10-CM

## 2018-08-13 DIAGNOSIS — Q43.3 MALROTATION COLON (H): Primary | ICD-10-CM

## 2018-08-13 PROCEDURE — 99204 OFFICE O/P NEW MOD 45 MIN: CPT | Performed by: SURGERY

## 2018-08-13 NOTE — MR AVS SNAPSHOT
After Visit Summary   8/13/2018    Chance Mazariegos    MRN: [id number]           Patient Information     Date Of Birth          1980        Visit Information        Provider Department      8/13/2018 8:30 AM Juaquin Paz MD [facility] General Surgery      Today's Diagnoses     Malrotation colon    -  1    Abdominal pain, generalized           Follow-ups after your visit        Your next 10 appointments already scheduled     Aug 14, 2018  4:30 PM CDT   CT ABDOMEN PELVIS W CONTRAST with SHCT2   [facility] CT ([facility])    [address] [place] [address]   [phone]           Please bring any scans or X-rays taken at other hospitals, if similar tests were done. Also bring a list of your medicines, including vitamins, minerals and over-the-counter drugs. It is safest to leave personal items at home.  Be sure to tell your doctor:   If you have any allergies.   If there s any chance you are pregnant.   If you are breastfeeding.  How to prepare:   Do not eat or drink for 2 hours before your exam. If you need to take medicine, you may take it with small sips of water. (We may ask you to take liquid medicine as well.)   Please wear loose clothing, such as a sweat suit or jogging clothes. Avoid snaps, zippers and other metal. We may ask you to undress and put on a hospital gown.  Please arrive 30 minutes early for your CT. Once in the department you might be asked to drink water 15-20 minutes prior to your exam.  If indicated you may be asked to drink an oral contrast in advance of your CT.  If this is the case, the imaging team will let you know or be in contact with you prior to your appointment  Patients over 70 or patients with diabetes or kidney problems:   If you haven t had a blood test (creatinine test) within the last 30 days, the Cardiologist/Radiologist may require you to get this test prior to your exam.  " If you have diabetes:   Continue to take your metformin medication on the day of your exam  If you have any questions, please call the Imaging Department where you will have your exam.              Future tests that were ordered for you today     Open Future Orders        Priority Expected Expires Ordered    CT Abdomen Pelvis w Contrast Routine 8/13/2018 8/13/2019 8/13/2018            Who to contact     If you have questions or need follow up information about today's clinic visit or your schedule please contact [facility] directly at [phone].  Normal or non-critical lab and imaging results will be communicated to you by RegistryLovehart, letter or phone within 4 business days after the clinic has received the results. If you do not hear from us within 7 days, please contact the clinic through Ponfac or phone. If you have a critical or abnormal lab result, we will notify you by phone as soon as possible.  Submit refill requests through Ponfac or call your pharmacy and they will forward the refill request to us. Please allow 3 business days for your refill to be completed.          Additional Information About Your Visit        Ponfac Information     Ponfac gives you secure access to your electronic health record. If you see a primary care provider, you can also send messages to your care team and make appointments. If you have questions, please call your primary care clinic.  If you do not have a primary care provider, please call [phone] and they will assist you.        Care EveryWhere ID     This is your Care EveryWhere ID. This could be used by other organizations to access your [facility] medical records  [id number]        Your Vitals Were     Pulse Height BMI (Body Mass Index)             77 5' 7\" (1.702 m) 36.18 kg/m2          Blood Pressure from Last 3 Encounters:   08/13/18 92/66   07/10/18 97/59   05/03/18 120/82    Weight from Last 3 Encounters:   08/13/18 231 lb (104.8 kg)   07/10/18 " 231 lb (104.8 kg)   05/03/18 232 lb (105.2 kg)               Primary Care Provider Office Phone # Fax #    Jemima Valdes PA-C [phone] [address] [place] [address]        Equal Access to Services     LUCY GLASER : Hadii aad ku hadasho Soomaali, waaxda luqadaha, qaybta kaalmada adeegyada, waxay idiin hayaan adeeg kharash la'aan . So [facility] [phone].    ATENCIÓN: Si habla español, tiene a dickerson disposición servicios gratuitos de asistencia lingüística. Llame al [phone].    We comply with applicable federal civil rights laws and [place] laws. We do not discriminate on the basis of race, color, national origin, age, disability, sex, sexual orientation, or gender identity.            Thank you!     Thank you for choosing [facility]  for your care. Our goal is always to provide you with excellent care. Hearing back from our patients is one way we can continue to improve our services. Please take a few minutes to complete the written survey that you may receive in the mail after your visit with us. Thank you!             Your Updated Medication List - Protect others around you: Learn how to safely use, store and throw away your medicines at www.disposemymeds.org.          This list is accurate as of 8/13/18  9:07 AM.  Always use your most recent med list.                   Brand Name Dispense Instructions for use Diagnosis    vitamin D 65895 UNIT capsule    ERGOCALCIFEROL    8 capsule    Take one capsule by mouth twice per week, as directed    Vitamin D deficiency

## 2018-08-13 NOTE — PROGRESS NOTES
[facility] Consultation    CHIEF COMPLAINT:  Chief Complaint   Patient presents with     Consult     Abd pain        HISTORY OF PRESENT ILLNESS:  Chance Mazariegos is a 37 year old female who is seen in consultation at the request of Dr. Valdes for evaluation of abdominal pain.  For years Mrs. Mazariegos has been experiencing some abdominal pain about an inch to the left of her umbilicus, this has now become more frequent and is happening one to twice a week.  It tends to happen later in the day, eating sometimes exacerbates it.  It might starts within 15 minutes of a meal and may last for a couple of hours, lying down tends to improve her symptoms.  No nausea, vomiting, no changes to her bowel habits.  She does take MiraLAX daily to keep her stools soft to treat her anal canal pain/history of fissures.  She is currently undergoing testing and changes in treatment with regards to her Hashimoto thyroiditis and multiple vitamin deficiencies/absorption irregularities.    REVIEW OF SYSTEMS:  Constitutional:  Negative for chills, fatigue, fever and she has gained some weight over the years, thought to be related to her endocrine disturbances.  Neuro: No extremity, nor facial weakness  Psych:  No unexpected changes in mood  Eyes:  Negative for new vision problems.  ENT:  Negative for ENT pain.  Cardiovascular:  Negative for chest pain, palpitations.  Respiratory:  Negative for cough, dyspnea.  Gastrointestinal: See HPI  Musculoskeletal:  Negative for new arthralgias or myalgias.  Integumentary: No new rashes nor lesions    Past Medical History:   Diagnosis Date     Hashimoto's thyroiditis      Spell of dizziness     dizziness, tachycardia, etc     Vitamin D deficiency        Past Surgical History:   Procedure Laterality Date     intestinal malrotation surgery 2011 2011    Exploratory Laparotomy     SALPINGECTOMY  2011    Performed at the time of her exploratory laparotomy       Family History has been  "reviewed.    Social History   Substance Use Topics     Smoking status: Never Smoker     Smokeless tobacco: Never Used     Alcohol use Yes      Comment: rare       Patient Active Problem List   Diagnosis     Obesity (BMI 35.0-39.9 without comorbidity)     Anemia, unspecified type     Hashimoto's thyroiditis     Vitamin D deficiency       Allergies   Allergen Reactions     Penicillins        Current Outpatient Prescriptions   Medication Sig Dispense Refill     vitamin D (ERGOCALCIFEROL) 24340 UNIT capsule Take one capsule by mouth twice per week, as directed 8 capsule 3       Vitals: BP 92/66  Pulse 77  Ht 5' 7\" (1.702 m)  Wt 231 lb (104.8 kg)  BMI 36.18 kg/m2  BMI= Body mass index is 36.18 kg/(m^2).    EXAM:  GENERAL: healthy, alert and no distress     HEENT: moist mucus membranes, no scleral icterus,   CARDIOVASCULAR:  RRR, No JVD  NEURO:  Alert;  well oriented to time, place and person.  RESPIRATORY: non labored breathing  NECK: Neck supple. No noticeable masses.  No lymphadenopathy noted.  ABDOMEN/GI: soft, nontender, nondistended, large midline incision without definitive hernia noted within the body habitus limitation.  EXTREMITIES: warm and well perfused, no edema  SKIN: No suspicious lesions or rashes    LABS/Imaging: No current studies    ASSESSMENT:  Chance Mazariegos suffers from 1. Malrotation colon    - CT Abdomen Pelvis w Contrast; Future    2. Abdominal pain, generalized    - CT Abdomen Pelvis w Contrast; Future      PLAN:  Abdominal pelvic CT scan of the abdomen with IV and oral contrast.  If an anatomical anomaly is encounter we can discuss possible surgical intervention.  Otherwise she will continue with adjustments to her diet and endocrine management, try to lose some weight and as a last resort the possibility of laparoscopic exploration if all else fails, she does understand there is a low yield.    She understands and agrees with this plan    It is my pleasure to participate in the care of " Chance Mazariegos. Thank you for this consultation.     If you have any questions please give me a call.    Best regards,  Juaquin Paz MD    Please route or send letter to:  Primary Care Provider (PCP), Referring Provider and Include Progress Note    Total time with patient visit: 45 minutes more than half spent in counseling, explanation of procedures and coordination of care.

## 2018-08-13 NOTE — LETTER
"2018    RE: Chance Mazariegos, : 1980      [facility] Consultation        HISTORY OF PRESENT ILLNESS:  Chance Mazariegos is a 37 year old female who is seen in consultation at the request of Dr. Valdes for evaluation of abdominal pain.  For years Mrs. Mazariegos has been experiencing some abdominal pain about an inch to the left of her umbilicus, this has now become more frequent and is happening one to twice a week.  It tends to happen later in the day, eating sometimes exacerbates it.  It might starts within 15 minutes of a meal and may last for a couple of hours, lying down tends to improve her symptoms.  No nausea, vomiting, no changes to her bowel habits.  She does take MiraLAX daily to keep her stools soft to treat her anal canal pain/history of fissures.  She is currently undergoing testing and changes in treatment with regards to her Hashimoto thyroiditis and multiple vitamin deficiencies/absorption irregularities.     REVIEW OF SYSTEMS:  Constitutional:  Negative for chills, fatigue, fever and she has gained some weight over the years, thought to be related to her endocrine disturbances.  Neuro: No extremity, nor facial weakness  Psych:  No unexpected changes in mood  Eyes:  Negative for new vision problems.  ENT:  Negative for ENT pain.  Cardiovascular:  Negative for chest pain, palpitations.  Respiratory:  Negative for cough, dyspnea.  Gastrointestinal: See HPI  Musculoskeletal:  Negative for new arthralgias or myalgias.  Integumentary: No new rashes nor lesions     Family History has been reviewed.     Vitals: BP 92/66  Pulse 77  Ht 5' 7\" (1.702 m)  Wt 231 lb (104.8 kg)  BMI 36.18 kg/m2  BMI= Body mass index is 36.18 kg/(m^2).     EXAM:  GENERAL: healthy, alert and no distress      HEENT: moist mucus membranes, no scleral icterus,   CARDIOVASCULAR:  RRR, No JVD  NEURO:  Alert;  well oriented to time, place and person.  RESPIRATORY: non labored breathing  NECK: Neck " supple. No noticeable masses.  No lymphadenopathy noted.  ABDOMEN/GI: soft, nontender, nondistended, large midline incision without definitive hernia noted within the body habitus limitation.  EXTREMITIES: warm and well perfused, no edema  SKIN: No suspicious lesions or rashes     LABS/Imaging: No current studies     ASSESSMENT:  Chance Mazariegos suffers from 1. Malrotation colon     - CT Abdomen Pelvis w Contrast; Future     2. Abdominal pain, generalized     - CT Abdomen Pelvis w Contrast; Future      PLAN:  Abdominal pelvic CT scan of the abdomen with IV and oral contrast.  If an anatomical anomaly is encounter we can discuss possible surgical intervention.  Otherwise she will continue with adjustments to her diet and endocrine management, try to lose some weight and as a last resort the possibility of laparoscopic exploration if all else fails, she does understand there is a low yield.     She understands and agrees with this plan     It is my pleasure to participate in the care of Chance Mazariegos. Thank you for this consultation.      If you have any questions please give me a call.     Best regards,  Juaquin Paz MD

## 2018-08-14 ENCOUNTER — HOSPITAL ENCOUNTER (OUTPATIENT)
Dept: CT IMAGING | Facility: CLINIC | Age: 38
Discharge: HOME | End: 2018-08-14
Attending: SURGERY | Admitting: SURGERY
Payer: COMMERCIAL

## 2018-08-14 DIAGNOSIS — Q43.3 MALROTATION COLON (H): ICD-10-CM

## 2018-08-14 DIAGNOSIS — R10.84 ABDOMINAL PAIN, GENERALIZED: ICD-10-CM

## 2018-08-14 PROCEDURE — 74177 CT ABD & PELVIS W/CONTRAST: CPT

## 2018-08-14 PROCEDURE — 25000125 ZZHC RX 250: Performed by: SURGERY

## 2018-08-14 PROCEDURE — 25000128 H RX IP 250 OP 636: Performed by: SURGERY

## 2018-08-14 RX ORDER — IOPAMIDOL 755 MG/ML
500 INJECTION, SOLUTION INTRAVASCULAR ONCE
Status: COMPLETED | OUTPATIENT
Start: 2018-08-14 | End: 2018-08-14

## 2018-08-14 RX ADMIN — SODIUM CHLORIDE, PRESERVATIVE FREE 100 ML: 5 INJECTION INTRAVENOUS at 16:44

## 2018-08-14 RX ADMIN — IOPAMIDOL 113 ML: 755 INJECTION, SOLUTION INTRAVENOUS at 16:39

## 2018-08-21 ENCOUNTER — TELEPHONE (OUTPATIENT)
Dept: SURGERY | Facility: CLINIC | Age: 38
End: 2018-08-21

## 2018-08-21 NOTE — TELEPHONE ENCOUNTER
Patient calling for CT results, performed on the 14th.  Reviewed results with pt, which are consistent with hx of malrotation, which patient already aware of.    No other acute process identified.  No abdominal hernia identified.    Encouraged patient to call if symptoms persist and this can be discussed with Dr. Paz for any additional recommendations.    She agreed and was very happy with the results.    Yue Urbano RN

## 2020-03-10 ENCOUNTER — HEALTH MAINTENANCE LETTER (OUTPATIENT)
Age: 40
End: 2020-03-10

## 2020-04-20 ENCOUNTER — VIRTUAL VISIT (OUTPATIENT)
Dept: FAMILY MEDICINE | Facility: OTHER | Age: 40
End: 2020-04-20

## 2020-12-27 ENCOUNTER — HEALTH MAINTENANCE LETTER (OUTPATIENT)
Age: 40
End: 2020-12-27

## 2021-04-23 ENCOUNTER — HOSPITAL ENCOUNTER (OUTPATIENT)
Dept: MAMMOGRAPHY | Age: 41
Discharge: HOME | End: 2021-04-23
Attending: INTERNAL MEDICINE

## 2021-04-23 DIAGNOSIS — Z12.39 BREAST CANCER SCREENING: ICD-10-CM

## 2021-04-23 PROCEDURE — 77063 BREAST TOMOSYNTHESIS BI: CPT

## 2021-04-24 ENCOUNTER — HEALTH MAINTENANCE LETTER (OUTPATIENT)
Age: 41
End: 2021-04-24

## 2021-05-07 ENCOUNTER — HOSPITAL ENCOUNTER (OUTPATIENT)
Dept: MAMMOGRAPHY | Age: 41
Discharge: HOME | End: 2021-05-07
Attending: INTERNAL MEDICINE

## 2021-05-07 ENCOUNTER — HOSPITAL ENCOUNTER (OUTPATIENT)
Dept: ULTRASOUND IMAGING | Age: 41
Discharge: HOME | End: 2021-05-07
Attending: INTERNAL MEDICINE

## 2021-05-07 DIAGNOSIS — R92.8 ABNORMAL MAMMOGRAM: ICD-10-CM

## 2021-05-07 PROCEDURE — 77066 DX MAMMO INCL CAD BI: CPT

## 2021-05-07 PROCEDURE — 76642 ULTRASOUND BREAST LIMITED: CPT

## 2021-06-25 NOTE — LETTER
ALCIDES pt. He is OOT. LOV 2/3/21  Plan:  1. Stress test due to chest pain- GXT Myoview   2. Continue current medications   3. Healthy lifestyle education reviewed including nutrition, exercise and activity  4. Follow up in 1 year   5.   Recommend COVID vaccine Service Date: 2018      [place] [address]      RE: Chance Mazariegos    MRN: [id number]   : 1980      Dear Jemima:       I had the pleasure of seeing your patient Chance Mazariegos at the [facility] for evaluation of palpitations.  Chance Mazariegos is a delightful, 37-year-old female  who presents for palpitations and tachycardia.  The patient states that she has been diagnosed with Hashimoto thyroiditis.  She has been on low-dose thyroid supplementation multiple times, but shortly after starting this, she will develop a feeling of clamminess, jittery, lightheadedness and heart racing.  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  When she stops her thyroid supplementation, her symptoms resolve.  Her tachycardia and clamminess can last up to 72 hours.  Her heart rate generally starts in the 70s to 80 beats per minute, increases to 90 and then will accelerate to 130 beats a minute, making her lightheaded.  When she gets to the ER, generally there have been no issues, and I did review an EKG from 2016, which showed normal sinus rhythm and no abnormalities.  Her last episode occurred in February of this year, again seeking attention in the emergency room.  She has some lightheadedness during these events.  She has not fainted.  She believes she may have a history of vasovagal issues.  Since her teen years, she has had episodes with nausea and near-syncope, but tachycardia, not bradycardia.  The patient does not notice any significant flushing during these events.  She does not notice significant headaches.  She has tried different formulations of her thyroid medications.  She has gone down to even once or twice per week with symptoms.  She works as a  predominantly in [place] and is back and forth between the [facility].  She  has not sought medical attention in the [place].  She has known low vitamin D levels and is on supplementation.  An EKG today demonstrates normal sinus rhythm and is a normal EKG.  An echocardiogram on  was normal without valvular abnormalities.  Normal left ventricular function and normal sinus rhythm at a rate of 80 beats per minute.  There was 1+ pulmonic insufficiency.  The patient exercises generally with a  2 times a week starting 3 months ago.  She swims or does cardio training once to twice per week.      PAST MEDICAL HISTORY:  Intestinal malrotation repair.  No other hospitalizations or surgery.      PHYSICAL EXAMINATION:  As listed below.      ASSESSMENT:  Noreen Soares is a delightful, 37-year-old female with episodes of tachycardia.  Etiology is unclear.  She has no structural abnormalities of her heart.  She is able to exercise with dyspnea on exertion, but otherwise no problems.  She does not describe a significant supraventricular tachycardia.  The length of these episodes up to 72 hours does not suggest an adrenal cause, such as pheochromocytoma.  Some of this may be anxiety provoked.      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  She notes that it takes about 30 days before she starts to have her sensations, so we will place a 2 week Ziopatch on .  We will then monitor her rhythms for the next 2 weeks.  The patient will call for any other episodes before that or after that.      It is my pleasure to assist in the care of Noreen Soares.  All her questions were answered to her satisfaction.      Sincerely,      Vesta Jewell MD, FACC         VESTA JEWELL MD, FACC             D: 2018   T: 2018   MT: nancy      Name:     NOREEN SOARES   MRN:      [id number]        Account:      [id number]   :      1980           Service Date: 2018       Document: [id number]           Outpatient Encounter Prescriptions as of 5/3/2018   Medication Sig Dispense Refill     fluticasone (FLOVENT HFA) 44 MCG/ACT Inhaler Inhale 2 puffs into the lungs 2 times daily (Patient not taking: Reported on 5/3/2018) 1 Inhaler 0     guaiFENesin-codeine (ROBITUSSIN AC) 100-10 MG/5ML SOLN solution Take 5 mLs by mouth every 4 hours as needed for cough (Patient not taking: Reported on 5/3/2018) 120 mL 0     levothyroxine (SYNTHROID/LEVOTHROID) 25 MCG tablet Take 1/2 tablet by mouth daily, as directed (Patient not taking: Reported on 5/3/2018) 15 tablet 5     vitamin D (ERGOCALCIFEROL) 36824 UNIT capsule Take one capsule by mouth twice per week, as directed 8 capsule 3     No facility-administered encounter medications on file as of 5/3/2018.        Again, thank you for allowing me to participate in the care of your patient.      Sincerely,    Yung Jewell MD     [facility]

## 2021-10-09 ENCOUNTER — HEALTH MAINTENANCE LETTER (OUTPATIENT)
Age: 41
End: 2021-10-09

## 2022-05-16 ENCOUNTER — HEALTH MAINTENANCE LETTER (OUTPATIENT)
Age: 42
End: 2022-05-16

## 2022-09-11 ENCOUNTER — HEALTH MAINTENANCE LETTER (OUTPATIENT)
Age: 42
End: 2022-09-11

## 2022-12-27 ENCOUNTER — TELEPHONE (OUTPATIENT)
Dept: HEALTH INFORMATION MANAGEMENT | Age: 42
End: 2022-12-27

## 2023-06-03 ENCOUNTER — HEALTH MAINTENANCE LETTER (OUTPATIENT)
Age: 43
End: 2023-06-03

## 2023-11-27 NOTE — PROGRESS NOTES
"Date: 2020 12:34:40  Clinician: Manjit Landrum  Clinician NPI: [id number]  Patient: Chance Mazariegos  Patient : 1980  Patient Address: [address] [place] [address]  Patient Phone: [phone]  Visit Protocol: URI  Patient Summary:  Chance is a 39 year old ( : 1980 ) female who initiated a Visit for COVID-19 (Coronavirus) evaluation and screening. When asked the question \"Please sign me up to receive news, health information and promotions from [employer].\", Chance responded \"No\".    Chance states her symptoms started gradually 2-3 weeks ago. After her symptoms started, they improved and then got worse again.   Her symptoms consist of chills, a cough, malaise, and a headache. She is experiencing mild difficulty breathing with activities but can speak normally in full sentences. Chance also feels feverish.   Symptom details     Cough: Chance coughs every 5-10 minutes and her cough is not more bothersome at night. Phlegm does not come into her throat when she coughs. She does not believe her cough is caused by post-nasal drip.     Temperature: Her current temperature is 99.8 degrees Fahrenheit.     Headache: She states the headache is mild (1-3 on a 10 point pain scale).      Chance denies having rhinitis, diarrhea, facial pain or pressure, sore throat, nasal congestion, ear pain, myalgias, vomiting, nausea, teeth pain, wheezing, anosmia, and ageusia. She also denies taking antibiotic medication for the symptoms, having a sinus infection within the past year, and having recent facial or sinus surgery in the past 60 days.   Precipitating events  She has not recently been exposed to someone with influenza. Chance has not been in close contact with any high risk individuals.   Pertinent COVID-19 (Coronavirus) information  Chance has not traveled internationally or to the areas where COVID-19 (Coronavirus) is widespread, including cruise ship travel in the last 14 days before the start of her symptoms.   " hCance does not work or volunteer as healthcare worker or a  and does not work or volunteer in a healthcare facility.   She does not live with a healthcare worker.   Chance has not had a close contact with a laboratory-confirmed COVID-19 patient within 14 days of symptom onset. She also has not had a close contact with a suspected COVID-19 patient within 14 days of symptom onset.   Pertinent medical history  Chance does not get yeast infections when she takes antibiotics.   Chance does not need a return to work/school note.   Weight: 240 lbs   Chance does not smoke or use smokeless tobacco.   She denies pregnancy and denies breastfeeding. She has menstruated in the past month.   Additional information as reported by the patient (free text): I don't think this was asked, but I have a lot of pressure in my chest. I feel like something heavy is on top of me. I've had a cough for many weeks now, but the other symptoms are happening right now.   Weight: 240 lbs    MEDICATIONS: gabapentin oral, cyclobenzaprine-irritant-counter irritant comb no.2, ALLERGIES: NKDA  Clinician Response:  Dear Chance,   Dear Chance  Your symptoms show that you may have coronavirus (COVID-19). This illness can cause fever, cough and trouble breathing. Many people get a mild case and get better on their own. Some people can get very sick.  Will I be tested for COVID-19?  Because the virus is spreading, we are no longer testing most patients. You may request testing if:   You are very ill. For example, you're on chemotherapy, dialysis or home hospice care. (Contact your specialty clinic or program.)   You live in a nursing home or other long-term care facility. (Talk to your nurse manager or medical director.)   You're a health care worker. (Contact your employee health office.)   How can I protect others?  Without a test, we can't know for sure that you have COVID-19. For safety, it's very important to follow these rules.  First, stay  home and away from others (self-isolate) until:   You've had no fever---and no medicine that reduces fever---for 3 full days (72 hours). And...    Your other symptoms have gotten better. For example, your cough or breathing has improved. And...   At least 7 days have passed since your symptoms started.   During this time:   Don't go to work, school or anywhere else.    Stay away from others in your home. No hugging, kissing or shaking hands.   Don't let anyone visit.   Cover your mouth and nose with a mask, tissue or wash cloth to avoid spreading germs.   Wash your hands and face often. Use soap and water.   How can I take care of myself?   1.Take Tylenol (acetaminophen) for fever or pain. If you have liver or kidney problems, ask your family doctor if it's okay to take Tylenol.        Adults can take either:    650 mg (two 325 mg pills) every 4 to 6 hours, or...   1,000 mg (two 500 mg pills) every 8 hours as needed.    Note: Don't take more than 3,000 mg in one day.   For children, check the Tylenol bottle for the right dose. The dose is based on the child's age or weight.   2.If you have other health problems (like cancer, heart failure, an organ transplant or severe kidney disease): Call your specialty clinic if you don't feel better in the next 2 days.       3.Know when to call 911: If your breathing is so bad that it keeps you from doing normal activities, call 911 or go to the emergency room. Tell them that you've been staying home and may have COVID-19.       4.Sign up for [employer]. We know it's scary to hear that you might have COVID-19. We want to track your symptoms to make sure you're okay over the next 2 weeks. Please look for an email from [employer]---this is a free, online program that we'll use to keep in touch. To sign up, follow the link in the email. Learn more at [email or web address].   Where can I get more information?  To learn more about COVID-19 and how to care for yourself  at home, please visit the CDC website at https://www.cdc.gov/coronavirus/2019-ncov/about/steps-when-sick.html.  For more options for care at [facility], please visit our website at [email or web address]     Diagnosis: Acute upper respiratory infection, unspecified  Diagnosis ICD: J06.9  Prescription: albuterol sulfate (Proventil HFA) 90 mcg/actuation inhalation HFA aerosol inhaler 1 200 inhalation aerosol with adapter (proventil hfa or equivalent), 0 days supply. Inhale 2 puffs every 4 hours as needed. Refills: 0, Refill as needed: no, Allow substitutions: yes  Prescription: azithromycin (Zithromax TRI-CECILIA) 500 mg oral tablet 3 tablet, 3 days supply. Take 1 tablet by mouth 1 time per day for 3 days. Refills: 0, Refill as needed: no, Allow substitutions: yes   (E4) spontaneous

## 2023-12-16 ENCOUNTER — HEALTH MAINTENANCE LETTER (OUTPATIENT)
Age: 43
End: 2023-12-16